# Patient Record
Sex: FEMALE | Race: BLACK OR AFRICAN AMERICAN | NOT HISPANIC OR LATINO | URBAN - METROPOLITAN AREA
[De-identification: names, ages, dates, MRNs, and addresses within clinical notes are randomized per-mention and may not be internally consistent; named-entity substitution may affect disease eponyms.]

---

## 2017-02-28 ENCOUNTER — OFFICE (OUTPATIENT)
Dept: URBAN - METROPOLITAN AREA CLINIC 12 | Facility: CLINIC | Age: 67
End: 2017-02-28
Payer: OTHER GOVERNMENT

## 2017-02-28 VITALS
HEIGHT: 64 IN | WEIGHT: 213 LBS | SYSTOLIC BLOOD PRESSURE: 136 MMHG | DIASTOLIC BLOOD PRESSURE: 87 MMHG | HEART RATE: 78 BPM

## 2017-02-28 DIAGNOSIS — E66.9 OBESITY, UNSPECIFIED: ICD-10-CM

## 2017-02-28 DIAGNOSIS — K21.9 GASTRO-ESOPHAGEAL REFLUX DISEASE WITHOUT ESOPHAGITIS: ICD-10-CM

## 2017-02-28 DIAGNOSIS — M06.9 RHEUMATOID ARTHRITIS, UNSPECIFIED: ICD-10-CM

## 2017-02-28 DIAGNOSIS — K30 FUNCTIONAL DYSPEPSIA: ICD-10-CM

## 2017-02-28 DIAGNOSIS — K58.9 IRRITABLE BOWEL SYNDROME WITHOUT DIARRHEA: ICD-10-CM

## 2017-02-28 DIAGNOSIS — R14.0 ABDOMINAL DISTENSION (GASEOUS): ICD-10-CM

## 2017-02-28 DIAGNOSIS — K59.00 CONSTIPATION, UNSPECIFIED: ICD-10-CM

## 2017-02-28 DIAGNOSIS — R10.13 EPIGASTRIC PAIN: ICD-10-CM

## 2017-02-28 DIAGNOSIS — I10 ESSENTIAL (PRIMARY) HYPERTENSION: ICD-10-CM

## 2017-02-28 PROCEDURE — G8427 DOCREV CUR MEDS BY ELIG CLIN: HCPCS | Performed by: INTERNAL MEDICINE

## 2017-02-28 PROCEDURE — 99214 OFFICE O/P EST MOD 30 MIN: CPT | Performed by: INTERNAL MEDICINE

## 2017-02-28 PROCEDURE — 36415 COLL VENOUS BLD VENIPUNCTURE: CPT | Performed by: INTERNAL MEDICINE

## 2017-02-28 RX ORDER — LANSOPRAZOLE 30 MG/1
CAPSULE, DELAYED RELEASE ORAL
Qty: 180 | Refills: 3 | Status: COMPLETED
Start: 2013-01-24 | End: 2017-02-28

## 2017-02-28 RX ORDER — ESOMEPRAZOLE MAGNESIUM 40 MG/1
40 CAPSULE, DELAYED RELEASE ORAL
Qty: 30 | Refills: 11 | Status: COMPLETED
Start: 2017-02-28 | End: 2017-08-01

## 2017-02-28 RX ORDER — RANITIDINE 150 MG/1
300 TABLET ORAL
Qty: 60 | Refills: 11 | Status: COMPLETED
Start: 2017-02-28 | End: 2017-09-14

## 2017-02-28 NOTE — SERVICEHPINOTES
Ms. Bellamy is a 66-year-old female with chronic abdominal discomfort who is here for follow-up. She has been seen in the past by Dr. Blunt and Dr. Suarez, and then most recently has been followed by Dr. Carson. She states that she has had issues for several years but especially last year has had some more problems with epigastric pain and indigestion. He will occur about every other day. She does not know of any particular food that causes it. She has tried to cut back on her diet and stay away from spicy food but she still has issues at times. She does not keep a food diary. The pain is a discomfort in her epigastric and left upper quadrant area. She also feels some burning in her chest. It will sometimes happen at night as well. She currently takes Prevacid twice a day. She states that she does not usually dinner after 6:00 but does admit to eating a bag of chips as a snack in the evenings. She has some relief with Mylanta and also takes a laxative as needed for constipation, though she does state that the symptoms can occur even when she is having regular bowel movements. She states that she had a colonoscopy by Dr. Carson in 2014 that was normal. She also had an EGD by him in June 2016 that was also reportedly negative. CT scan in June 2016 was normal except for diverticulosis and fatty liver. Abdominal x-ray around the same time was also normal, as was a chest x-ray. She underwent gastric emptying study in August 2016 that was normal. Blood work performed by her PCP in January 2017 revealed a normal CBC and BMP. 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 Biopsies of the stomach only revealed reactive gastropathy was negative for H. pylori. She had another EGD in 2012 with negative esophageal and stomach biopsies as well. She has tried Reglan in the past which caused side effects. She has had other imaging in the past as well and has been negative. Her last colonoscopy at [facility] was in 2008 and was normal. She states that she has had a positive H. pylori test in the past and was treated with antibiotics.

## 2017-02-28 NOTE — SERVICENOTES
The patient has multiple GI complaints which could very well be due to IBS but also could have an acid dyspepsia component or component of bacterial overgrowth.  She does also appear to have some concomitant gas bloat syndrome and constipation.  We will try to help regular bowel movements with fiber and also put her on a probiotic.  We will check a right upper quadrant ultrasound and consider HIDA scan if this is negative.  I will switch her from Prevacid to Nexium and also add an H2 blocker.  If this does not work we can consider switching to Dexilant or trial of Carafate.  We can also consider an antispasmodic.  I did a long discussion with the patient regarding her diet and recommended that she stay away from certain foods known to cause gas and bloating as well as a trial off of dairy for the time being.  She does take multiple medications which may also be playing a role with her symptoms.  We will try to obtain the most recent procedural records from Dr. Carson's office.  This time given that she has had multiple procedures in the past and I do not bleed repeating these would be of much benefit to her unless her symptoms change significantly.  She should also take Gaviscon as needed.

## 2017-03-04 LAB
ENDOMYSIAL ANTIBODY, IGA: ENA IGA: NEGATIVE
HEPATIC FUNCTION PANEL A: ALBUMIN: 4.1 G/DL (ref 3.5–5.2)
HEPATIC FUNCTION PANEL A: ALKALINE PHOSPHATASE: 85 U/L (ref 34–115)
HEPATIC FUNCTION PANEL A: DIRECT BILIRUBIN: 0.1 MG/DL (ref 0–0.2)
HEPATIC FUNCTION PANEL A: SGOT (AST): 20 U/L (ref 13–40)
HEPATIC FUNCTION PANEL A: SGPT (ALT): 16 U/L (ref 7–52)
HEPATIC FUNCTION PANEL A: TOTAL BILIRUBIN: 0.4 MG/DL (ref 0.3–1.2)
HEPATIC FUNCTION PANEL A: TOTAL PROTEIN: 6.7 G/DL (ref 6.4–8.3)
IMMUNOGLOBULIN A: 218 MG/DL (ref 70–400)
TISSUE TRANSGLUTAMINASE IGA AB: TTG IGA RESULT: <0.5 U/ML

## 2017-04-11 ENCOUNTER — OFFICE (OUTPATIENT)
Dept: URBAN - METROPOLITAN AREA CLINIC 14 | Facility: CLINIC | Age: 67
End: 2017-04-11
Payer: OTHER GOVERNMENT

## 2017-04-11 DIAGNOSIS — K30 FUNCTIONAL DYSPEPSIA: ICD-10-CM

## 2017-04-11 DIAGNOSIS — K21.9 GASTRO-ESOPHAGEAL REFLUX DISEASE WITHOUT ESOPHAGITIS: ICD-10-CM

## 2017-04-11 PROCEDURE — 91065 BREATH HYDROGEN/METHANE TEST: CPT | Performed by: INTERNAL MEDICINE

## 2017-04-11 PROCEDURE — G8427 DOCREV CUR MEDS BY ELIG CLIN: HCPCS | Performed by: INTERNAL MEDICINE

## 2017-05-01 ENCOUNTER — OFFICE (OUTPATIENT)
Dept: URBAN - METROPOLITAN AREA CLINIC 11 | Facility: CLINIC | Age: 67
End: 2017-05-01
Payer: OTHER GOVERNMENT

## 2017-05-01 VITALS
HEART RATE: 47 BPM | DIASTOLIC BLOOD PRESSURE: 78 MMHG | WEIGHT: 212 LBS | HEIGHT: 64 IN | SYSTOLIC BLOOD PRESSURE: 139 MMHG

## 2017-05-01 DIAGNOSIS — K59.00 CONSTIPATION, UNSPECIFIED: ICD-10-CM

## 2017-05-01 DIAGNOSIS — K21.9 GASTRO-ESOPHAGEAL REFLUX DISEASE WITHOUT ESOPHAGITIS: ICD-10-CM

## 2017-05-01 DIAGNOSIS — K30 FUNCTIONAL DYSPEPSIA: ICD-10-CM

## 2017-05-01 DIAGNOSIS — R14.0 ABDOMINAL DISTENSION (GASEOUS): ICD-10-CM

## 2017-05-01 DIAGNOSIS — E66.9 OBESITY, UNSPECIFIED: ICD-10-CM

## 2017-05-01 DIAGNOSIS — A04.9 BACTERIAL INTESTINAL INFECTION, UNSPECIFIED: ICD-10-CM

## 2017-05-01 DIAGNOSIS — K58.9 IRRITABLE BOWEL SYNDROME WITHOUT DIARRHEA: ICD-10-CM

## 2017-05-01 DIAGNOSIS — Z86.010 PERSONAL HISTORY OF COLONIC POLYPS: ICD-10-CM

## 2017-05-01 DIAGNOSIS — R14.1 GAS PAIN: ICD-10-CM

## 2017-05-01 DIAGNOSIS — M06.9 RHEUMATOID ARTHRITIS, UNSPECIFIED: ICD-10-CM

## 2017-05-01 PROCEDURE — G8427 DOCREV CUR MEDS BY ELIG CLIN: HCPCS | Performed by: INTERNAL MEDICINE

## 2017-05-01 PROCEDURE — 99213 OFFICE O/P EST LOW 20 MIN: CPT | Performed by: INTERNAL MEDICINE

## 2017-05-01 NOTE — SERVICEHPINOTES
Ms. Bellamy is a 66-year-old female with chronic abdominal discomfort who is here for follow-up. She has been seen in the past by Dr. Blunt and Dr. Suarez, and then most recently has been followed by Dr. Carson. She states that she has had issues for several years but especially last year has had some more problems with epigastric pain and indigestion. He will occur about every other day. She does not know of any particular food that causes it. She has tried to cut back on her diet and stay away from spicy food but she still has issues at times. She does not keep a food diary. She states that she does not usually dinner after 6:00 but does admit to eating a bag of chips as a snack in the evenings. She has some relief with Mylanta and also takes a laxative as needed for constipation, though she does state that the symptoms can occur even when she is having regular bowel movements. She states that she had a colonoscopy by Dr. Carson in 2014 that was normal. She also had an EGD by him in June 2016 that was negative with negative stomach, esophagus, and duodenal biopsies. CT scan in June 2016 was normal except for diverticulosis and fatty liver. Abdominal x-ray around the same time was also normal, as was a chest x-ray. She underwent gastric emptying study in August 2016 that was normal. Blood work performed by her PCP in January 2017 revealed a normal CBC and BMP. In the past at [facility] she had an EGD in October 2013 that revealed a ring in the lower esophagus and a 3 cm hiatus hernia. Biopsies of the stomach only revealed reactive gastropathy was negative for H. pylori. She had another EGD in 2012 with negative esophageal and stomach biopsies as well. She has tried Reglan in the past which caused side effects. She has had other imaging in the past as well and has been negative. Her last colonoscopy at [facility] was in 2008 and was normal. She states that she has had a positive H. pylori test in the past and was treated with antibiotics, but most recent EGD stomach biopsies were negative.When I initially saw her we checked basic blood work including liver enzymes and celiac labs which were all negative. Abdominal ultrasound only showed some fatty liver and benign kidney cysts. HIDA scan was normal with ejection fraction 59 percent. She did undergo lactulose breath test which did appear consistent with bacterial overgrowth. Unfortunately her insurance company denied use of Xifaxan so she was given a course of metronidazole. At the very end of treatment of this she did have some issues with hematuria and because of this went to [facility] last week and was treated with different antibiotics for a 10 day course, which she is still taking. This new antibiotic, cefdinir, has caused some relatively loose bowel movements. CT scan during her EGD visit was completely normal, as was blood work. Overall she states that her symptoms are a little better as she does not have much pain but still has more issues with bloating and indigestion depending on certain foods that she eats. She still does not keep a food diary.

## 2017-05-01 NOTE — SERVICENOTES
The patient has had an extensive negative workup as above.  The only thing that is slightly abnormal was positive lactulose breath test indicating possible small intestinal bacterial overgrowth.  Unfortunately, Xifaxan was not approved by her insurance company and so she underwent treatment with metronidazole.  She was recently diagnosed as having a UTI and is now on cefdinir.  She has had some slight change in her bowel habits since been on cefdinir which is likely just antibiotic effect, however I did recommend that she notify us if her balance did not go back to normal after completion of this I which time we should check for C. difficile, etc.  I will give her a trial of Dexilant to see if this may help her symptoms as she did not have good response with her current PPI as well as Prevacid.  She should continue her H2 blocker.  It is uncertain as to what role her other medications may play but she does take multiple other medications which could be playing a role in her symptoms.  I also discussed with her the importance of dietary modification and we did discuss the possibility of a FODMAP diet trial.  Fortunately, the patient does not have any red flag symptoms.  Her weight is stable.  She has had an extensive negative workup as above which has been negative.

## 2017-06-20 ENCOUNTER — OFFICE (OUTPATIENT)
Dept: URBAN - METROPOLITAN AREA CLINIC 12 | Facility: CLINIC | Age: 67
End: 2017-06-20
Payer: MEDICARE

## 2017-06-20 VITALS
SYSTOLIC BLOOD PRESSURE: 127 MMHG | HEIGHT: 64 IN | HEART RATE: 76 BPM | WEIGHT: 211 LBS | DIASTOLIC BLOOD PRESSURE: 84 MMHG

## 2017-06-20 DIAGNOSIS — R14.0 ABDOMINAL DISTENSION (GASEOUS): ICD-10-CM

## 2017-06-20 DIAGNOSIS — R19.4 CHANGE IN BOWEL HABIT: ICD-10-CM

## 2017-06-20 DIAGNOSIS — A04.7 ENTEROCOLITIS DUE TO CLOSTRIDIUM DIFFICILE: ICD-10-CM

## 2017-06-20 DIAGNOSIS — K22.2 ESOPHAGEAL OBSTRUCTION: ICD-10-CM

## 2017-06-20 DIAGNOSIS — K21.9 GASTRO-ESOPHAGEAL REFLUX DISEASE WITHOUT ESOPHAGITIS: ICD-10-CM

## 2017-06-20 DIAGNOSIS — R13.10 DYSPHAGIA, UNSPECIFIED: ICD-10-CM

## 2017-06-20 DIAGNOSIS — E66.9 OBESITY, UNSPECIFIED: ICD-10-CM

## 2017-06-20 DIAGNOSIS — R19.7 DIARRHEA, UNSPECIFIED: ICD-10-CM

## 2017-06-20 DIAGNOSIS — M06.9 RHEUMATOID ARTHRITIS, UNSPECIFIED: ICD-10-CM

## 2017-06-20 PROCEDURE — G8427 DOCREV CUR MEDS BY ELIG CLIN: HCPCS | Performed by: INTERNAL MEDICINE

## 2017-06-20 PROCEDURE — 99213 OFFICE O/P EST LOW 20 MIN: CPT | Performed by: INTERNAL MEDICINE

## 2017-06-20 NOTE — SERVICENOTES
The patient has had recent C. difficile infection causing diarrhea.  She has completed a two-week course of metronidazole and states that her bowel movements are better, they have not returned back to normal.  I am hopeful that they will continue to improve, however if her diarrhea worsens or if her stool sample, which she submitted yesterday, comes back positive for C. difficile again then we should proceed with oral vancomycin therapy.  She does have a history of some narrowing of her esophagus and underwent dilation last year.  She says as well as for repeat EGD with dilation with the patient states that she is not interested diet carefully, chew her food well, eat slowly and to notify us if she changes her mind and would like to proceed with esophageal dilation.

## 2017-06-20 NOTE — SERVICEHPINOTES
Ms. Bellamy is a 66-year-old female with chronic abdominal discomfort who is here for follow-up. She has been seen in the past by Dr. Blunt and Dr. Suarez, and then most recently has been followed by Dr. Carson. She states that she has had issues for several years but especially last year has had some more problems with epigastric pain and indigestion. He will occur about every other day. She does not know of any particular food that causes it. She has tried to cut back on her diet and stay away from spicy food but she still has issues at times. She does not keep a food diary. She states that she does not usually dinner after 6:00 but does admit to eating a bag of chips as a snack in the evenings. She has some relief with Mylanta and also takes a laxative as needed for constipation, though she does state that the symptoms can occur even when she is having regular bowel movements. She states that she had a colonoscopy by Dr. Carson in 2014 that was normal. She also had an EGD by him in June 2016 that was negative with negative stomach, esophagus, and duodenal biopsies.  There was mild narrowing of the esophagus and she underwent 50Fr dilation CT scan in June 2016 was normal except for diverticulosis and fatty liver. Abdominal x-ray around the same time was also normal, as was a chest x-ray. She underwent gastric emptying study in August 2016 that was normal. Blood work performed by her PCP in January 2017 revealed a normal CBC and BMP. In the past at [facility] she had an EGD in October 2013 that revealed a ring in the lower esophagus and a 3 cm hiatus hernia. Biopsies of the stomach only revealed reactive gastropathy was negative for H. pylori. She had another EGD in 2012 with negative esophageal and stomach biopsies as well. She has tried Reglan in the past which caused side effects. She has had other imaging in the past as well and has been negative. Her last colonoscopy at [facility] was in 2008 and was normal. She states that she has had a positive H. pylori test in the past and was treated with antibiotics, but most recent EGD stomach biopsies were negative. When I initially saw her we checked basic blood work including liver enzymes and celiac labs which were all negative. Abdominal ultrasound only showed some fatty liver and benign kidney cysts. HIDA scan was normal with ejection fraction 59 percent. She did undergo lactulose breath test which did appear consistent with bacterial overgrowth. Unfortunately her insurance company denied use of Xifaxan so she was given a course of metronidazole. At the very end of treatment of this she did have some issues with hematuria and because of this went to [facility] and was treated with different antibiotics for a 10 day course. CT scan during her ED visit was completely normal, as was blood work. During taking her antibiotics for her urinary tract infection she started having worse diarrhea that persisted. Because of this she was seen by her PCP at the beginning of June where stool study confirmed that she was positive for C. difficile. Because of this she was started on metronidazole. She states that her diarrhea persisted but has slowly improved. She just completed her course of measure states that she is no longer having any watery bowel movements. During her flare she had 6-7 watery to loose bowel movement today but now she states that she is to having only 2-3 bowel movements which are soft to loose. They are brown. They have much more form. Her bowel movements are not quite back to normal. She has not had any weight loss over this time. Two. She also notes occasional dysphagia mainly with pills but sometimes with solid food. BR

## 2017-08-01 ENCOUNTER — OFFICE (OUTPATIENT)
Dept: URBAN - METROPOLITAN AREA CLINIC 12 | Facility: CLINIC | Age: 67
End: 2017-08-01
Payer: MEDICARE

## 2017-08-01 VITALS
HEART RATE: 68 BPM | WEIGHT: 209 LBS | HEIGHT: 64 IN | DIASTOLIC BLOOD PRESSURE: 96 MMHG | SYSTOLIC BLOOD PRESSURE: 154 MMHG

## 2017-08-01 DIAGNOSIS — K59.00 CONSTIPATION, UNSPECIFIED: ICD-10-CM

## 2017-08-01 DIAGNOSIS — R14.0 ABDOMINAL DISTENSION (GASEOUS): ICD-10-CM

## 2017-08-01 DIAGNOSIS — K22.2 ESOPHAGEAL OBSTRUCTION: ICD-10-CM

## 2017-08-01 DIAGNOSIS — A04.9 BACTERIAL INTESTINAL INFECTION, UNSPECIFIED: ICD-10-CM

## 2017-08-01 DIAGNOSIS — E66.9 OBESITY, UNSPECIFIED: ICD-10-CM

## 2017-08-01 DIAGNOSIS — I10 ESSENTIAL (PRIMARY) HYPERTENSION: ICD-10-CM

## 2017-08-01 DIAGNOSIS — K21.9 GASTRO-ESOPHAGEAL REFLUX DISEASE WITHOUT ESOPHAGITIS: ICD-10-CM

## 2017-08-01 DIAGNOSIS — M06.9 RHEUMATOID ARTHRITIS, UNSPECIFIED: ICD-10-CM

## 2017-08-01 DIAGNOSIS — A04.7 ENTEROCOLITIS DUE TO CLOSTRIDIUM DIFFICILE: ICD-10-CM

## 2017-08-01 PROCEDURE — G8427 DOCREV CUR MEDS BY ELIG CLIN: HCPCS | Performed by: INTERNAL MEDICINE

## 2017-08-01 PROCEDURE — 99213 OFFICE O/P EST LOW 20 MIN: CPT | Performed by: INTERNAL MEDICINE

## 2017-08-01 RX ORDER — METRONIDAZOLE 500 MG/1
TABLET, FILM COATED ORAL
Qty: 28 | Refills: 1 | Status: COMPLETED
Start: 2017-08-01 | End: 2017-10-25

## 2017-08-01 NOTE — SERVICENOTES
Her C diff appears to have resolved with Flagyl therapy.  Overall she states that she is doing better but does still have some bloating issues.  We did discuss the importance of staying away from certain foods that cause bloating and I did recommend she start taking a probiotic.  We also discussed the possible long-term effects of PPI use and she states she is willing to give H2 blocker therapy and try but would like to go back to Pepcid if symptoms worsen.  Given that she is having some more symptoms that could be consistent with return of her bacterial overgrowth it is reasonable to treat again with antibiotics, however, while her C diff was likely due to the urinary tract infection antibiotic she received just after she took Xifaxan, the close proximity does make me somewhat concerned about the possibility of Xifaxan been a trigger for C diff, though this is rare.  Because of this we will give her another round of Flagyl to see if this may help her. if it does not and her symptoms worsen we can always consider another course of Xifaxan or Alinia down the road.

## 2017-08-01 NOTE — SERVICEHPINOTES
Ms. Bellamy is a 66-year-old female with chronic abdominal discomfort who is here for follow-up. She has been seen in the past by Dr. Blunt and Dr. Suarez, and then most recently has been followed by Dr. Carson. She states that she has had issues for several years but especially last year has had some more problems with epigastric pain and indigestion. He will occur about every other day. She does not know of any particular food that causes it. She has tried to cut back on her diet and stay away from spicy food but she still has issues at times. She does not keep a food diary. She states that she does not usually dinner after 6:00 but does admit to eating a bag of chips as a snack in the evenings. She has some relief with Mylanta and also takes a laxative as needed for constipation, though she does state that the symptoms can occur even when she is having regular bowel movements. She states that she had a colonoscopy by Dr. Carson in 2014 that was normal. She also had an EGD by him in June 2016 that was negative with negative stomach, esophagus, and duodenal biopsies. There was mild narrowing of the esophagus and she underwent 50Fr dilation CT scan in June 2016 was normal except for diverticulosis and fatty liver. Abdominal x-ray around the same time was also normal, as was a chest x-ray. She underwent gastric emptying study in August 2016 that was normal. Blood work performed by her PCP in January 2017 revealed a normal CBC and BMP. In the past at [facility] she had an EGD in October 2013 that revealed a ring in the lower esophagus and a 3 cm hiatus hernia. Biopsies of the stomach only revealed reactive gastropathy was negative for H. pylori. She had another EGD in 2012 with negative esophageal and stomach biopsies as well. She has tried Reglan in the past which caused side effects. She has had other imaging in the past as well and has been negative. Her last colonoscopy at [facility] was in 2008 and was normal. She states that she has had a positive H. pylori test in the past and was treated with antibiotics, but most recent EGD stomach biopsies were negative. When I initially saw her we checked basic blood work including liver enzymes and celiac labs which were all negative. Abdominal ultrasound only showed some fatty liver and benign kidney cysts. HIDA scan was normal with ejection fraction 59 percent. She did undergo lactulose breath test which did appear consistent with bacterial overgrowth. Unfortunately her insurance company denied use of Xifaxan so she was given a course of metronidazole. At the very end of treatment of this she did have some issues with hematuria and because of this went to [facility] and was treated with different antibiotics for a 10 day course. CT scan during her ED visit was completely normal, as was blood work. While taking her antibiotics for her urinary tract infection she started having worse diarrhea that persisted. Because of this she was seen by her PCP at the beginning of June 2017 where stool study confirmed that she was positive for C. difficile. Because of this she was started on metronidazole, which has since improved her symptoms.  She now states that her diarrhea has resolved and she is not having 1 formed bowel movement a day. She does have some bloating that is mainly after eating salads but can be with other foods as well. It is similar to prior to her being treated for Xifaxan and she is wondering if her bacterial overgrowth is coming back. She denies any real abdominal pain. She states that she also would like to switch off of omeprazole back to Prevacid that she states the omeprazole causes some epigastric burning. She denies any fevers, chills, nausea, vomiting, rectal bleeding, or weight loss. BR

## 2017-09-14 ENCOUNTER — OFFICE (OUTPATIENT)
Dept: URBAN - METROPOLITAN AREA CLINIC 14 | Facility: CLINIC | Age: 67
End: 2017-09-14
Payer: OTHER GOVERNMENT

## 2017-09-14 VITALS
WEIGHT: 212 LBS | DIASTOLIC BLOOD PRESSURE: 73 MMHG | HEIGHT: 64 IN | HEART RATE: 57 BPM | SYSTOLIC BLOOD PRESSURE: 142 MMHG

## 2017-09-14 DIAGNOSIS — E66.9 OBESITY, UNSPECIFIED: ICD-10-CM

## 2017-09-14 DIAGNOSIS — K21.9 GASTRO-ESOPHAGEAL REFLUX DISEASE WITHOUT ESOPHAGITIS: ICD-10-CM

## 2017-09-14 DIAGNOSIS — R19.7 DIARRHEA, UNSPECIFIED: ICD-10-CM

## 2017-09-14 DIAGNOSIS — A04.7 ENTEROCOLITIS DUE TO CLOSTRIDIUM DIFFICILE: ICD-10-CM

## 2017-09-14 DIAGNOSIS — A04.9 BACTERIAL INTESTINAL INFECTION, UNSPECIFIED: ICD-10-CM

## 2017-09-14 DIAGNOSIS — K59.00 CONSTIPATION, UNSPECIFIED: ICD-10-CM

## 2017-09-14 DIAGNOSIS — R14.0 ABDOMINAL DISTENSION (GASEOUS): ICD-10-CM

## 2017-09-14 DIAGNOSIS — R19.4 CHANGE IN BOWEL HABIT: ICD-10-CM

## 2017-09-14 PROCEDURE — 99214 OFFICE O/P EST MOD 30 MIN: CPT | Performed by: INTERNAL MEDICINE

## 2017-09-14 PROCEDURE — G8427 DOCREV CUR MEDS BY ELIG CLIN: HCPCS | Performed by: INTERNAL MEDICINE

## 2017-09-14 RX ORDER — HYOSCYAMINE SULFATE 0.12 MG/1
TABLET ORAL; SUBLINGUAL
Qty: 90 | Refills: 3 | Status: COMPLETED
Start: 2017-09-14 | End: 2018-02-06

## 2017-09-14 RX ORDER — RANITIDINE 150 MG/1
300 TABLET ORAL
Qty: 60 | Refills: 11 | Status: COMPLETED
Start: 2017-02-28 | End: 2017-09-14

## 2017-09-14 NOTE — SERVICENOTES
The patient has longstanding GI symptoms but did have some C diff colitis recently after receiving antibiotics for urinary tract infection.  She did respond well with metronidazole and had improvement of symptoms but now she is having some return of some of her IBS symptoms.  It is uncertain as to whether not the metronidazole as causing some burning discomfort or this is due to her IBS.  She did have some diarrhea last week.  She did bring a stool sample from today, but since she is on metronidazole I do not think that it would be beneficial to test as she is on antibiotics.  I recommended she complete a course of metronidazole, which should be finished by next week.  If the diarrhea persists then we will recheck her stool for C diff.  As she has not had a colonoscopy in over 3 years and her last EGD was over a year ago and she is still having persistent symptoms the patient is interested in repeating endoscopic evaluation, which is reasonable.  We will schedule her for this accordingly.  I also give her a trial of an antispasmodic to see this may help.

## 2017-09-14 NOTE — SERVICEHPINOTES
Ms. Bellamy is a 67-year-old female with chronic abdominal discomfort who is here for follow-up. She has been seen in the past by Dr. Blunt and Dr. Suarez, and then most recently has been followed by Dr. Carson. She states that she has had issues for several years but especially last year has had some more problems with epigastric pain and indigestion. He will occur about every other day. She does not know of any particular food that causes it. She has tried to cut back on her diet and stay away from spicy food but she still has issues at times. She does not keep a food diary. She states that she does not usually dinner after 6:00 but does admit to eating a bag of chips as a snack in the evenings. She has some relief with Mylanta and also takes a laxative as needed for constipation, though she does state that the symptoms can occur even when she is having regular bowel movements. She states that she had a colonoscopy by Dr. Carson in 2014 that was normal. She also had an EGD by him in June 2016 that was negative with negative stomach, esophagus, and duodenal biopsies. There was mild narrowing of the esophagus and she underwent 50Fr dilation CT scan in June 2016 was normal except for diverticulosis and fatty liver. Abdominal x-ray around the same time was also normal, as was a chest x-ray. She underwent gastric emptying study in August 2016 that was normal. Blood work performed by her PCP in January 2017 revealed a normal CBC and BMP. In the past at [facility] she had an EGD in October 2013 that revealed a ring in the lower esophagus and a 3 cm hiatus hernia. Biopsies of the stomach only revealed reactive gastropathy was negative for H. pylori. She had another EGD in 2012 with negative esophageal and stomach biopsies as well. She has tried Reglan in the past which caused side effects. She has had other imaging in the past as well and has been negative. Her last colonoscopy at [facility] was in 2008 and was normal. She states that she has had a positive H. pylori test in the past and was treated with antibiotics, but most recent EGD stomach biopsies were negative. When I initially saw her we checked basic blood work including liver enzymes and celiac labs which were all negative. Abdominal ultrasound only showed some fatty liver and benign kidney cysts. HIDA scan was normal with ejection fraction 59 percent. She did undergo lactulose breath test which did appear consistent with bacterial overgrowth. Unfortunately her insurance company denied use of Xifaxan so she was given a course of metronidazole. At the very end of treatment of this she did have some issues with hematuria and because of this went to [facility] and was treated with different antibiotics for a 10 day course. CT scan during her ED visit was completely normal, as was blood work. While taking her antibiotics for her urinary tract infection she started having worse diarrhea that persisted. Because of this she was seen by her PCP at the beginning of June 2017 where stool study confirmed that she was positive for C. difficile. Because of this she was started on metronidazole, which has since improved her symptoms. The patient's symptoms initially improved after treatment with metronidazole. When I last saw her she started having some return of some of her symptoms and so because of this I recommended going back on other trial of metronidazole. Also recommended switching from her PPI to ranitidine. The patient has continued is a both ranitidine and her PPI. She states that her symptoms, go on metronidazole. She states some weeks that she will do well but then other weeks she will have generalized constant burning discomfort. She is uncertain as to whether not the metronidazole is causing her burning discomfort. She continues to take a probiotic. She has not been tried on an antispasmodic. She is concerned that she has cancer because of her persistent symptoms even though she has had symptoms for several years. She denies any fevers or chills. She has gained 3 lb since her last visit. She states that she has tried to be on a bland diet but does admit to eating some fried food and dairy on occasional basis. She did have some diarrhea last week but states that it has gone away this week. she admits that when she takes Zofran she becomes more constipated.BR

## 2017-10-25 ENCOUNTER — AMBULATORY SURGICAL CENTER (OUTPATIENT)
Dept: URBAN - METROPOLITAN AREA SURGERY 3 | Facility: SURGERY | Age: 67
End: 2017-10-25

## 2017-10-25 ENCOUNTER — OFFICE (OUTPATIENT)
Dept: URBAN - METROPOLITAN AREA CLINIC 11 | Facility: CLINIC | Age: 67
End: 2017-10-25

## 2017-10-25 ENCOUNTER — AMBULATORY SURGICAL CENTER (OUTPATIENT)
Dept: URBAN - METROPOLITAN AREA SURGERY 3 | Facility: SURGERY | Age: 67
End: 2017-10-25
Payer: COMMERCIAL

## 2017-10-25 VITALS
HEART RATE: 83 BPM | RESPIRATION RATE: 16 BRPM | HEART RATE: 82 BPM | RESPIRATION RATE: 18 BRPM | OXYGEN SATURATION: 98 % | DIASTOLIC BLOOD PRESSURE: 74 MMHG | RESPIRATION RATE: 16 BRPM | OXYGEN SATURATION: 94 % | DIASTOLIC BLOOD PRESSURE: 70 MMHG | OXYGEN SATURATION: 97 % | DIASTOLIC BLOOD PRESSURE: 81 MMHG | TEMPERATURE: 98 F | DIASTOLIC BLOOD PRESSURE: 77 MMHG | DIASTOLIC BLOOD PRESSURE: 81 MMHG | RESPIRATION RATE: 18 BRPM | OXYGEN SATURATION: 93 % | SYSTOLIC BLOOD PRESSURE: 124 MMHG | SYSTOLIC BLOOD PRESSURE: 121 MMHG | DIASTOLIC BLOOD PRESSURE: 74 MMHG | RESPIRATION RATE: 19 BRPM | HEART RATE: 99 BPM | SYSTOLIC BLOOD PRESSURE: 144 MMHG | HEIGHT: 64 IN | TEMPERATURE: 98 F | RESPIRATION RATE: 20 BRPM | DIASTOLIC BLOOD PRESSURE: 73 MMHG | SYSTOLIC BLOOD PRESSURE: 121 MMHG | HEIGHT: 64 IN | DIASTOLIC BLOOD PRESSURE: 73 MMHG | HEART RATE: 83 BPM | DIASTOLIC BLOOD PRESSURE: 70 MMHG | RESPIRATION RATE: 19 BRPM | SYSTOLIC BLOOD PRESSURE: 124 MMHG | HEART RATE: 82 BPM | OXYGEN SATURATION: 97 % | SYSTOLIC BLOOD PRESSURE: 110 MMHG | SYSTOLIC BLOOD PRESSURE: 118 MMHG | WEIGHT: 212 LBS | DIASTOLIC BLOOD PRESSURE: 77 MMHG | OXYGEN SATURATION: 93 % | TEMPERATURE: 96.7 F | WEIGHT: 212 LBS | OXYGEN SATURATION: 98 % | TEMPERATURE: 96.7 F | OXYGEN SATURATION: 94 % | OXYGEN SATURATION: 99 % | HEART RATE: 88 BPM | RESPIRATION RATE: 20 BRPM | SYSTOLIC BLOOD PRESSURE: 110 MMHG | OXYGEN SATURATION: 99 % | SYSTOLIC BLOOD PRESSURE: 118 MMHG | SYSTOLIC BLOOD PRESSURE: 144 MMHG | HEART RATE: 99 BPM | HEART RATE: 88 BPM

## 2017-10-25 DIAGNOSIS — R14.0 ABDOMINAL DISTENSION (GASEOUS): ICD-10-CM

## 2017-10-25 DIAGNOSIS — R19.7 DIARRHEA, UNSPECIFIED: ICD-10-CM

## 2017-10-25 DIAGNOSIS — K29.50 UNSPECIFIED CHRONIC GASTRITIS WITHOUT BLEEDING: ICD-10-CM

## 2017-10-25 DIAGNOSIS — K64.0 FIRST DEGREE HEMORRHOIDS: ICD-10-CM

## 2017-10-25 DIAGNOSIS — K21.9 GASTRO-ESOPHAGEAL REFLUX DISEASE WITHOUT ESOPHAGITIS: ICD-10-CM

## 2017-10-25 DIAGNOSIS — K57.30 DIVERTICULOSIS OF LARGE INTESTINE WITHOUT PERFORATION OR ABS: ICD-10-CM

## 2017-10-25 DIAGNOSIS — K63.5 POLYP OF COLON: ICD-10-CM

## 2017-10-25 PROBLEM — D12.5 BENIGN NEOPLASM OF SIGMOID COLON: Status: ACTIVE | Noted: 2017-10-25

## 2017-10-25 PROBLEM — K52.89 CLINICALLY SIGNIFICANT DIARRHEA OF UNEXPLAINED ORIGIN: Status: ACTIVE | Noted: 2017-10-25

## 2017-10-25 PROCEDURE — 43239 EGD BIOPSY SINGLE/MULTIPLE: CPT | Performed by: INTERNAL MEDICINE

## 2017-10-25 PROCEDURE — 45380 COLONOSCOPY AND BIOPSY: CPT | Performed by: INTERNAL MEDICINE

## 2017-10-25 PROCEDURE — 88305 TISSUE EXAM BY PATHOLOGIST: CPT | Performed by: INTERNAL MEDICINE

## 2017-10-25 PROCEDURE — 88313 SPECIAL STAINS GROUP 2: CPT | Performed by: INTERNAL MEDICINE

## 2017-10-25 PROCEDURE — G8918 PT W/O PREOP ORDER IV AB PRO: HCPCS | Performed by: INTERNAL MEDICINE

## 2017-10-25 PROCEDURE — G8907 PT DOC NO EVENTS ON DISCHARG: HCPCS | Performed by: INTERNAL MEDICINE

## 2017-10-25 PROCEDURE — 88342 IMHCHEM/IMCYTCHM 1ST ANTB: CPT | Performed by: INTERNAL MEDICINE

## 2017-11-22 ENCOUNTER — OFFICE (OUTPATIENT)
Dept: URBAN - METROPOLITAN AREA CLINIC 11 | Facility: CLINIC | Age: 67
End: 2017-11-22
Payer: OTHER GOVERNMENT

## 2017-11-22 VITALS
SYSTOLIC BLOOD PRESSURE: 129 MMHG | DIASTOLIC BLOOD PRESSURE: 70 MMHG | HEART RATE: 54 BPM | HEIGHT: 64 IN | WEIGHT: 209 LBS

## 2017-11-22 DIAGNOSIS — K21.9 GASTRO-ESOPHAGEAL REFLUX DISEASE WITHOUT ESOPHAGITIS: ICD-10-CM

## 2017-11-22 DIAGNOSIS — K30 FUNCTIONAL DYSPEPSIA: ICD-10-CM

## 2017-11-22 DIAGNOSIS — R14.0 ABDOMINAL DISTENSION (GASEOUS): ICD-10-CM

## 2017-11-22 DIAGNOSIS — A04.71 ENTEROCOLITIS DUE TO CLOSTRIDIUM DIFFICILE, RECURRENT: ICD-10-CM

## 2017-11-22 DIAGNOSIS — M06.9 RHEUMATOID ARTHRITIS, UNSPECIFIED: ICD-10-CM

## 2017-11-22 DIAGNOSIS — E66.9 OBESITY, UNSPECIFIED: ICD-10-CM

## 2017-11-22 DIAGNOSIS — K58.9 IRRITABLE BOWEL SYNDROME WITHOUT DIARRHEA: ICD-10-CM

## 2017-11-22 DIAGNOSIS — A04.9 BACTERIAL INTESTINAL INFECTION, UNSPECIFIED: ICD-10-CM

## 2017-11-22 DIAGNOSIS — K59.00 CONSTIPATION, UNSPECIFIED: ICD-10-CM

## 2017-11-22 PROCEDURE — G8427 DOCREV CUR MEDS BY ELIG CLIN: HCPCS | Performed by: INTERNAL MEDICINE

## 2017-11-22 PROCEDURE — 99213 OFFICE O/P EST LOW 20 MIN: CPT | Performed by: INTERNAL MEDICINE

## 2017-11-22 NOTE — SERVICENOTES
The patient has longstanding GI symptoms that are likely due to IBS.  She also has chronic reflux.  She is hesitant to continue PPI use which is reasonable.  We did discuss the possible long-term effects of PPI use.  I did recommend that she switch to Pepcid to see if this may not cause the same problems that Zantac cause.  If this does cause issues for her then she can consider Tagamet.  If these do not work then we may consider going back to a low-dose PPI.  She does appear to be doing better from a symptomatic standpoint otherwise and so I did recommend she continue to work on dietary modification weight loss.  Given her constipation issues we will give her some samples of Trulance to see if this may work for her.  If it does we can call in a prescription.  She should otherwise notify us if her symptoms do worsen in the meantime.

## 2017-11-22 NOTE — SERVICEHPINOTES
Ms. Bellamy is a 67-year-old female with chronic abdominal discomfort who is here for follow-up. She has been seen in the past by Dr. Blunt and Dr. Suarez, and then most recently has been followed by Dr. Carson. She states that she has had issues for several years but especially last year has had some more problems with epigastric pain and indigestion. He will occur about every other day. She does not know of any particular food that causes it. She has tried to cut back on her diet and stay away from spicy food but she still has issues at times. She does not keep a food diary. She states that she does not usually dinner after 6:00 but does admit to eating a bag of chips as a snack in the evenings. She has some relief with Mylanta and also takes a laxative as needed for constipation, though she does state that the symptoms can occur even when she is having regular bowel movements. She states that she had a colonoscopy by Dr. Carson in 2014 that was normal. She also had an EGD by him in June 2016 that was negative with negative stomach, esophagus, and duodenal biopsies. There was mild narrowing of the esophagus and she underwent 50Fr dilation CT scan in June 2016 was normal except for diverticulosis and fatty liver. Abdominal x-ray around the same time was also normal, as was a chest x-ray. She underwent gastric emptying study in August 2016 that was normal. Blood work performed by her PCP in January 2017 revealed a normal CBC and BMP. In the past at [facility] she had an EGD in October 2013 that revealed a ring in the lower esophagus and a 3 cm hiatus hernia. Biopsies of the stomach only revealed reactive gastropathy was negative for H. pylori. She had another EGD in 2012 with negative esophageal and stomach biopsies as well. She has tried Reglan in the past which caused side effects. She has had other imaging in the past as well and has been negative. Her last colonoscopy at [facility] was in 2008 and was normal. She states that she has had a positive H. pylori test in the past and was treated with antibiotics, but most recent EGD stomach biopsies were negative. When I initially saw her we checked basic blood work including liver enzymes and celiac labs which were all negative. Abdominal ultrasound only showed some fatty liver and benign kidney cysts. HIDA scan was normal with ejection fraction 59 percent. She did undergo lactulose breath test which did appear consistent with bacterial overgrowth. Unfortunately her insurance company denied use of Xifaxan so she was given a course of metronidazole. At the very end of treatment of this she did have some issues with hematuria and because of this went to [facility] and was treated with different antibiotics for a 10 day course. CT scan during her ED visit was completely normal, as was blood work. While taking her antibiotics for her urinary tract infection she started having worse diarrhea that persisted. Because of this she was seen by her PCP at the beginning of June 2017 where stool study confirmed that she was positive for C. difficile. Because of this she was started on metronidazole, which has since improved her symptoms. The patient's symptoms initially improved after treatment with metronidazole. Because of some return of symptoms she underwent EGD and colonoscopy in October 2017 with EGD overall normal with negative stomach and duodenal biopsies. There was some mild lymphangiectasia. Esophageal biopsies were also negative. The colon was also normal to the terminal ileum with only a small polyp removed. It was hyperplastic. She now tells me today that she has a history of adenomatous colon polyps in the past and so her next colonoscopy will be due in 5 years. She states overall she is doing better though she is concerned about long-term PPI use. Because of this she was taking only ranitidine but she states that ranitidine cause worse constipation and tinnitus. She has since stopped ranitidine and her reflux and indigestion symptoms have worsened. She otherwise denies any fevers, chills, nausea, or vomiting. She has not had any further diarrhea.BR

## 2018-02-06 ENCOUNTER — OFFICE (OUTPATIENT)
Dept: URBAN - METROPOLITAN AREA CLINIC 12 | Facility: CLINIC | Age: 68
End: 2018-02-06
Payer: OTHER GOVERNMENT

## 2018-02-06 VITALS
HEART RATE: 91 BPM | HEIGHT: 64 IN | SYSTOLIC BLOOD PRESSURE: 115 MMHG | WEIGHT: 212 LBS | DIASTOLIC BLOOD PRESSURE: 76 MMHG

## 2018-02-06 DIAGNOSIS — K57.30 DIVERTICULOSIS OF LARGE INTESTINE WITHOUT PERFORATION OR ABS: ICD-10-CM

## 2018-02-06 DIAGNOSIS — E66.9 OBESITY, UNSPECIFIED: ICD-10-CM

## 2018-02-06 DIAGNOSIS — K30 FUNCTIONAL DYSPEPSIA: ICD-10-CM

## 2018-02-06 DIAGNOSIS — K58.9 IRRITABLE BOWEL SYNDROME WITHOUT DIARRHEA: ICD-10-CM

## 2018-02-06 DIAGNOSIS — M06.9 RHEUMATOID ARTHRITIS, UNSPECIFIED: ICD-10-CM

## 2018-02-06 DIAGNOSIS — R14.0 ABDOMINAL DISTENSION (GASEOUS): ICD-10-CM

## 2018-02-06 DIAGNOSIS — I10 ESSENTIAL (PRIMARY) HYPERTENSION: ICD-10-CM

## 2018-02-06 DIAGNOSIS — A04.71 ENTEROCOLITIS DUE TO CLOSTRIDIUM DIFFICILE, RECURRENT: ICD-10-CM

## 2018-02-06 DIAGNOSIS — K59.00 CONSTIPATION, UNSPECIFIED: ICD-10-CM

## 2018-02-06 DIAGNOSIS — K21.9 GASTRO-ESOPHAGEAL REFLUX DISEASE WITHOUT ESOPHAGITIS: ICD-10-CM

## 2018-02-06 DIAGNOSIS — A04.9 BACTERIAL INTESTINAL INFECTION, UNSPECIFIED: ICD-10-CM

## 2018-02-06 PROCEDURE — 99213 OFFICE O/P EST LOW 20 MIN: CPT | Performed by: INTERNAL MEDICINE

## 2018-02-06 RX ORDER — LANSOPRAZOLE 30 MG/1
CAPSULE, DELAYED RELEASE PELLETS ORAL
Qty: 90 | Refills: 3 | Status: ACTIVE

## 2018-02-06 NOTE — SERVICEHPINOTES
Ms. Bellamy is a 67-year-old female with chronic GERD and constipation who is here for follow-up. She states that she has had issues for several years. When I initially saw her she was have more issues with epigastric pain and indigestion. She states that she had a colonoscopy by Dr. Carson in 2014 that was normal. She also had an EGD by him in June 2016 that was negative with negative stomach, esophagus, and duodenal biopsies. There was mild narrowing of the esophagus and she underwent 50Fr dilation CT scan in June 2016 was normal except for diverticulosis and fatty liver. Abdominal x-ray around the same time was also normal, as was a chest x-ray. She underwent gastric emptying study in August 2016 that was normal. Blood work performed by her PCP in January 2017 revealed a normal CBC and BMP. In the past at [facility] she had an EGD in October 2013 that revealed a ring in the lower esophagus and a 3 cm hiatus hernia. Biopsies of the stomach only revealed reactive gastropathy was negative for H. pylori. She had another EGD in 2012 with negative esophageal and stomach biopsies as well. She has tried Reglan in the past which caused side effects. She has had other imaging in the past as well and has been negative. She states that she has had a positive H. pylori test in the past and was treated with antibiotics, but most recent EGD stomach biopsies were negative. When I initially saw her we checked basic blood work including liver enzymes and celiac labs which were all negative. Abdominal ultrasound only showed some fatty liver and benign kidney cysts. HIDA scan was normal with ejection fraction 59 percent. She did undergo lactulose breath test which did appear consistent with bacterial overgrowth. Unfortunately her insurance company denied use of Xifaxan so she was given a course of metronidazole. At the very end of treatment of this she did have some issues with hematuria and because of this went to [facility] and was treated with different antibiotics for a 10 day course. CT scan during her ED visit was completely normal, as was blood work. While taking her antibiotics for her urinary tract infection she started having worse diarrhea that persisted. Because of this she was seen by her PCP at the beginning of June 2017 where stool study confirmed that she was positive for C. difficile. Because of this she was started on metronidazole, which has since improved her symptoms. Because of some return of symptoms she underwent EGD and colonoscopy in October 2017 with EGD overall normal with negative stomach and duodenal biopsies. There was some mild lymphangiectasia. Esophageal biopsies were also negative. The colon was also normal to the terminal ileum with only a small polyp removed. It was hyperplastic. She states that she was told she had pre-cancerous polyps at some point and so her next colonoscopy is due in 2022. Overall she has been doing relatively well since then. We did try to switch her to an H2 blocker but she states ranitidine caused constipation and tendinitis. Pepcid did not work either. She has been on lansoprazole for the past three months which she states works very well for her. She also states that her constipation is better. We gave her Trulance in the past, however she states now she only eats an apple every day and tries to eat more fiber in her diet. She does admit to using MiraLax and a fiber supplement only 3 times a week but states that for the most part this controls her symptoms. She does take occasional NSAIDs for rheumatoid arthritis and has been weaned off of Rituxan. She is on chronic prednisone therapy for her rheumatoid arthritis and she states she recently had a DEXA scan that was negative for osteoporosis. She does continue to take a probiotic as well. She denies any fevers, chills, nausea, vomiting, rectal bleeding, or weight loss. She has gained 3 lb since our last visit.LYNNETTE

## 2018-02-06 NOTE — SERVICENOTES
Overall she is doing much better.  She did not do well with a trial of H2 blockers but I do recommend trying to get her on the lowest dose of PPI possible, especially as she is on long-term prednisone use and is at risk for osteoporosis.  We will give her a trial of lansoprazole 15 mg daily.  If this does not work she should notify us and we can go back to the 30 mg dose.  In the meantime we did also reiterate the importance of lifestyle modification, especially weight loss.  She does admit that she does eat snacks throughout the day and does not exercise much.  I also recommended she consider taking fiber supplement and possibly even MiraLax on a more regular basis to improve her bowel habits.  She should otherwise notify us if symptoms worsen in the meantime.

## 2018-08-16 ENCOUNTER — OFFICE (OUTPATIENT)
Dept: URBAN - METROPOLITAN AREA CLINIC 14 | Facility: CLINIC | Age: 68
End: 2018-08-16
Payer: OTHER GOVERNMENT

## 2018-08-16 VITALS
DIASTOLIC BLOOD PRESSURE: 71 MMHG | WEIGHT: 222 LBS | HEIGHT: 64 IN | HEART RATE: 54 BPM | SYSTOLIC BLOOD PRESSURE: 116 MMHG

## 2018-08-16 DIAGNOSIS — R14.0 ABDOMINAL DISTENSION (GASEOUS): ICD-10-CM

## 2018-08-16 DIAGNOSIS — K30 FUNCTIONAL DYSPEPSIA: ICD-10-CM

## 2018-08-16 DIAGNOSIS — K58.1 IRRITABLE BOWEL SYNDROME WITH CONSTIPATION: ICD-10-CM

## 2018-08-16 DIAGNOSIS — M06.9 RHEUMATOID ARTHRITIS, UNSPECIFIED: ICD-10-CM

## 2018-08-16 DIAGNOSIS — A04.9 BACTERIAL INTESTINAL INFECTION, UNSPECIFIED: ICD-10-CM

## 2018-08-16 DIAGNOSIS — K21.9 GASTRO-ESOPHAGEAL REFLUX DISEASE WITHOUT ESOPHAGITIS: ICD-10-CM

## 2018-08-16 DIAGNOSIS — E66.9 OBESITY, UNSPECIFIED: ICD-10-CM

## 2018-08-16 DIAGNOSIS — I10 ESSENTIAL (PRIMARY) HYPERTENSION: ICD-10-CM

## 2018-08-16 PROCEDURE — 99213 OFFICE O/P EST LOW 20 MIN: CPT | Performed by: INTERNAL MEDICINE

## 2018-08-16 RX ORDER — SUCRALFATE 1 G/10ML
4000 SUSPENSION ORAL
Qty: 1 | Refills: 1 | Status: COMPLETED
Start: 2018-08-16 | End: 2020-08-05

## 2018-08-16 RX ORDER — LANSOPRAZOLE 30 MG/1
CAPSULE, DELAYED RELEASE PELLETS ORAL
Qty: 90 | Refills: 3 | Status: ACTIVE

## 2018-08-16 NOTE — SERVICENOTES
The patient has had some worsening symptoms of her normal indigestion, nausea, bloating, gas, and constipation.  It sounds like she has stopped taking MiraLax so recommended she get back to taking MiraLax once to twice a day.  If this does not work for her constipation she should let us know and we can either retry some more of the other constipation medications such as Amitza.  She may take magnesium citrate if she is significantly constipated.  I will check a KUB to ensure no evidence of any blockage.  It appears that she will not be able to tolerate low-dose PPI so we will go ahead and have her increase back to 30 mg a day and continue this for now.  I recommended she stop taking the Gaviscon as she thinks it may have caused some constipation but she can return to taking it as needed once her constipation is better.  I did recommend she also take an H2 blocker as needed in place of Gaviscon.  I will give her a trial of sucralfate to see if that may help her symptoms as well until her PPI is able to kick back in.  She was told to notify us if she is not better in the next couple of weeks and at that time we can consider repeat treatment of bacterial overgrowth, which has helped her in the past.  Also explained to the patient that her weight gain may also be playing a role and we discussed the importance of lifestyle modification, weight loss, exercise, etc.  She is interested in learning more about the ideal protein program.

## 2018-08-16 NOTE — SERVICEHPINOTES
Ms. Bellamy is a 68-year-old female with chronic GERD and constipation who is here for worsening symptoms. She states that she has had issues for several years. When I initially saw her she was have more issues with epigastric pain and indigestion. She states that she had a colonoscopy by Dr. Carson in 2014 that was normal. She also had an EGD by him in June 2016 that was negative with negative stomach, esophagus, and duodenal biopsies. There was mild narrowing of the esophagus and she underwent 50Fr dilation CT scan in June 2016 was normal except for diverticulosis and fatty liver. Abdominal x-ray around the same time was also normal, as was a chest x-ray. She underwent gastric emptying study in August 2016 that was normal. Blood work performed by her PCP in January 2017 revealed a normal CBC and BMP. In the past at [facility] she had an EGD in October 2013 that revealed a ring in the lower esophagus and a 3 cm hiatus hernia. Biopsies of the stomach only revealed reactive gastropathy was negative for H. pylori. She had another EGD in 2012 with negative esophageal and stomach biopsies as well. She has tried Reglan in the past which caused side effects. She has had other imaging in the past as well and has been negative. She states that she has had a positive H. pylori test in the past and was treated with antibiotics, but most recent EGD stomach biopsies were negative. When I initially saw her we checked basic blood work including liver enzymes and celiac labs which were all negative. Abdominal ultrasound only showed some fatty liver and benign kidney cysts. HIDA scan was normal with ejection fraction 59 percent. She did undergo lactulose breath test which did appear consistent with bacterial overgrowth. Unfortunately her insurance company denied use of Xifaxan so she was given a course of metronidazole. At the very end of treatment of this she did have some issues with hematuria and because of this went to [facility] and was treated with different antibiotics for a 10 day course. CT scan during her ED visit was completely normal, as was blood work. While taking her antibiotics for her urinary tract infection she started having worse diarrhea that persisted. Because of this she was seen by her PCP at the beginning of June 2017 where stool study confirmed that she was positive for C. difficile. Because of this she was started on metronidazole, which has since improved her symptoms. Because of some return of symptoms she underwent EGD and colonoscopy in October 2017 with EGD overall normal with negative stomach and duodenal biopsies. There was some mild lymphangiectasia. Esophageal biopsies were also negative. The colon was also normal to the terminal ileum with only a small polyp removed. It was hyperplastic. She states that she was told she had pre-cancerous polyps at some point and so her next colonoscopy is due in 2022. We did try to switch her to an H2 blocker but she states ranitidine caused constipation and tendinitis. Pepcid did not work either. We gave her Trulance in the past, however she states now she only eats an apple every day and tries to eat more fiber in her diet. She does take occasional NSAIDs for rheumatoid arthritis and has been weaned off of Rituxan. When I last saw the patient she was doing very well with constipation controlled with MiraLax and fiber and reflux controlled on lansoprazole 30 mg daily.  We decided to try to cut back on lansoprazole to 15 mg a day.  The patient states that she has done well up until about two weeks ago when she started having some worse issues with indigestion, reflux, bloating, and gas.  She also has had some nausea.  She was taking Gaviscon more regularly and thinks that this may have cause more constipation issues.  She now is having small, incomplete bowel movements every day.  She also states that she recently ate pizza which also set off her symptoms.  She has gained 10 lb since our last visit.  She denies any fevers or chills.  She has gone back to taking lansoprazole 30 mg over the past week and notes that it is somewhat better but she is still having issues with nausea and indigestion as well as constipation.LYNNETTE

## 2019-02-12 ENCOUNTER — OFFICE (OUTPATIENT)
Dept: URBAN - METROPOLITAN AREA CLINIC 12 | Facility: CLINIC | Age: 69
End: 2019-02-12
Payer: OTHER GOVERNMENT

## 2019-02-12 VITALS
DIASTOLIC BLOOD PRESSURE: 82 MMHG | WEIGHT: 223 LBS | SYSTOLIC BLOOD PRESSURE: 128 MMHG | HEART RATE: 68 BPM | HEIGHT: 64 IN

## 2019-02-12 DIAGNOSIS — K58.9 IRRITABLE BOWEL SYNDROME WITHOUT DIARRHEA: ICD-10-CM

## 2019-02-12 DIAGNOSIS — M06.9 RHEUMATOID ARTHRITIS, UNSPECIFIED: ICD-10-CM

## 2019-02-12 DIAGNOSIS — K21.9 GASTRO-ESOPHAGEAL REFLUX DISEASE WITHOUT ESOPHAGITIS: ICD-10-CM

## 2019-02-12 DIAGNOSIS — A04.9 BACTERIAL INTESTINAL INFECTION, UNSPECIFIED: ICD-10-CM

## 2019-02-12 DIAGNOSIS — R19.4 CHANGE IN BOWEL HABIT: ICD-10-CM

## 2019-02-12 DIAGNOSIS — R14.0 ABDOMINAL DISTENSION (GASEOUS): ICD-10-CM

## 2019-02-12 PROCEDURE — 99213 OFFICE O/P EST LOW 20 MIN: CPT | Performed by: INTERNAL MEDICINE

## 2019-02-12 NOTE — SERVICEHPINOTES
Ms. Bellamy is a 68-year-old female with chronic GERD and constipation who is here for worsening symptoms. She states that she has had issues for several years. When I initially saw her she was have more issues with epigastric pain and indigestion. She states that she had a colonoscopy by Dr. Carson in 2014 that was normal. She also had an EGD by him in June 2016 that was negative with negative stomach, esophagus, and duodenal biopsies. There was mild narrowing of the esophagus and she underwent 50Fr dilation CT scan in June 2016 was normal except for diverticulosis and fatty liver. Abdominal x-ray around the same time was also normal, as was a chest x-ray. She underwent gastric emptying study in August 2016 that was normal. Blood work performed by her PCP in January 2017 revealed a normal CBC and BMP. In the past at [facility] she had an EGD in October 2013 that revealed a ring in the lower esophagus and a 3 cm hiatus hernia. Biopsies of the stomach only revealed reactive gastropathy was negative for H. pylori. She had another EGD in 2012 with negative esophageal and stomach biopsies as well. She has tried Reglan in the past which caused side effects. She has had other imaging in the past as well and has been negative. She states that she has had a positive H. pylori test in the past and was treated with antibiotics, but most recent EGD stomach biopsies were negative. When I initially saw her we checked basic blood work including liver enzymes and celiac labs which were all negative. Abdominal ultrasound only showed some fatty liver and benign kidney cysts. HIDA scan was normal with ejection fraction 59 percent. She did undergo lactulose breath test which did appear consistent with bacterial overgrowth. Unfortunately her insurance company denied use of Xifaxan so she was given a course of metronidazole. At the very end of treatment of this she did have some issues with hematuria and because of this went to [facility] and was treated with different antibiotics for a 10 day course. CT scan during her ED visit was completely normal, as was blood work. While taking her antibiotics for her urinary tract infection she started having worse diarrhea that persisted. Because of this she was seen by her PCP at the beginning of June 2017 where stool study confirmed that she was positive for C. difficile. Because of this she was started on metronidazole, which has since improved her symptoms. Because of some return of symptoms she underwent EGD and colonoscopy in October 2017 with EGD overall normal with negative stomach and duodenal biopsies. There was some mild lymphangiectasia. Esophageal biopsies were also negative. The colon was also normal to the terminal ileum with only a small polyp removed. It was hyperplastic. She states that she was told she had pre-cancerous polyps at some point and so her next colonoscopy is due in 2022. We did try to switch her to an H2 blocker but she states ranitidine caused constipation and tendinitis. Pepcid did not work either. We gave her Trulance in the past, however she states now she only eats an apple every day and tries to eat more fiber in her diet. She does take occasional NSAIDs for rheumatoid arthritis and has been weaned off of Rituxan. Overall she has been doing better. We did try to cut back on lansoprazole to 15 mg a day and take Gaviscon as needed but because of this she was having some nausea so we had her go back to taking lansoprazole 30 mg a day. Her weight is stable. Her constipation was controlled with fiber and MiraLax but she states that she was having some more issues recently with some constipation and so started to take some Dulcolax and since then has had some loose bowel movements. She otherwise denies any fevers, chills, nausea, or vomiting. We did give her sucralfate when I last saw her but she has not been taking this. When I last saw her also recommended she take a probiotic but does not appear that she was taking this either. She denies recent antibiotics.BR

## 2019-02-12 NOTE — SERVICENOTES
Overall she is doing fair.  Her reflux is under good control with lansoprazole also recommended that she continue this as she did have worse symptoms we tried to cut back.  I did recommend she consider holding off on taking MiraLax for now since she has had a change in bowel habits and start taking over-the-counter stool softener.  I did also discuss the possibility of Linzess or Trulance for her to take if her constipation does not improve and I did recommend she also take a probiotic as above.  Her loose bowel movements are likely related to the fact that she may have overdosed a little on laxatives.  If this does not resolve upon cutting back on laxatives then I recommend that she notify us we may consider checking stool studies.  She was otherwise instructed to notify us if she has any new or worsening symptoms in the meantime.  We can also consider another trial of Xifaxan if she starts having worsening symptoms.

## 2019-08-13 ENCOUNTER — OFFICE (OUTPATIENT)
Dept: URBAN - METROPOLITAN AREA CLINIC 12 | Facility: CLINIC | Age: 69
End: 2019-08-13
Payer: COMMERCIAL

## 2019-08-13 VITALS
WEIGHT: 224 LBS | DIASTOLIC BLOOD PRESSURE: 80 MMHG | SYSTOLIC BLOOD PRESSURE: 140 MMHG | HEART RATE: 67 BPM | HEIGHT: 64 IN

## 2019-08-13 DIAGNOSIS — K58.1 IRRITABLE BOWEL SYNDROME WITH CONSTIPATION: ICD-10-CM

## 2019-08-13 DIAGNOSIS — A04.9 BACTERIAL INTESTINAL INFECTION, UNSPECIFIED: ICD-10-CM

## 2019-08-13 DIAGNOSIS — R14.0 ABDOMINAL DISTENSION (GASEOUS): ICD-10-CM

## 2019-08-13 DIAGNOSIS — K21.9 GASTRO-ESOPHAGEAL REFLUX DISEASE WITHOUT ESOPHAGITIS: ICD-10-CM

## 2019-08-13 PROCEDURE — 99213 OFFICE O/P EST LOW 20 MIN: CPT | Performed by: INTERNAL MEDICINE

## 2019-08-13 RX ORDER — LANSOPRAZOLE 30 MG/1
CAPSULE, DELAYED RELEASE PELLETS ORAL
Qty: 90 | Refills: 3 | Status: ACTIVE

## 2019-08-13 NOTE — SERVICENOTES
Overall the patient is doing well.  She is willing to go back down to a low-dose PPI.  She has tried this in the past and did have some issues but states that overall she thinks that it will work well for her as long she watches her diet.  If it does not work for her we can always go back to the 30 mg dose.  The patient is now minimal to a trial of low-dose Linzess we will give her some sample as above.  She was instructed to let us know whether not works so we can call in a prescription.  If it does not work we can always go up on the dose or consider trial of Amitza.  Otherwise, the patient can always just go back to taking MiraLax on a regular basis as well. Since she has been doing well we will see her back in a year but she was instructed to notify us if she has any new or worsening symptoms in the meantime.  If she does have more persistent symptoms with bloating and generalized abdominal discomfort with decent bowel movements we can always consider another course of Xifaxan.

## 2019-08-13 NOTE — SERVICEHPINOTES
Ms. Bellamy is a 69-year-old female with chronic GERD and constipation who is here for worsening symptoms. She states that she has had issues for several years. When I initially saw her she was have more issues with epigastric pain and indigestion. She states that she had a colonoscopy by Dr. Carson in 2014 that was normal. She also had an EGD by him in June 2016 that was negative with negative stomach, esophagus, and duodenal biopsies. There was mild narrowing of the esophagus and she underwent 50Fr dilation CT scan in June 2016 was normal except for diverticulosis and fatty liver. Abdominal x-ray around the same time was also normal, as was a chest x-ray. She underwent gastric emptying study in August 2016 that was normal. Blood work performed by her PCP in January 2017 revealed a normal CBC and BMP. In the past at [facility] she had an EGD in October 2013 that revealed a ring in the lower esophagus and a 3 cm hiatus hernia. Biopsies of the stomach only revealed reactive gastropathy was negative for H. pylori. She had another EGD in 2012 with negative esophageal and stomach biopsies as well. She has tried Reglan in the past which caused side effects. She has had other imaging in the past as well and has been negative. She states that she has had a positive H. pylori test in the past and was treated with antibiotics, but most recent EGD stomach biopsies were negative. When I initially saw her we checked basic blood work including liver enzymes and celiac labs which were all negative. Abdominal ultrasound only showed some fatty liver and benign kidney cysts. HIDA scan was normal with ejection fraction 59 percent. She did undergo lactulose breath test which did appear consistent with bacterial overgrowth. Unfortunately her insurance company denied use of Xifaxan so she was given a course of metronidazole. At the very end of treatment of this she did have some issues with hematuria and because of this went to [facility] and was treated with different antibiotics for a 10 day course. CT scan during her ED visit was completely normal, as was blood work. While taking her antibiotics for her urinary tract infection she started having worse diarrhea that persisted. Because of this she was seen by her PCP at the beginning of June 2017 where stool study confirmed that she was positive for C. difficile. Because of this she was started on metronidazole, which has since improved her symptoms. Because of some return of symptoms she underwent EGD and colonoscopy in October 2017 with EGD overall normal with negative stomach and duodenal biopsies. There was some mild lymphangiectasia. Esophageal biopsies were also negative. The colon was also normal to the terminal ileum with only a small polyp removed. It was hyperplastic. She states that she was told she had pre-cancerous polyps at some point and so her next colonoscopy is due in 2022. We did try to switch her to an H2 blocker but she states ranitidine caused constipation and tendinitis. Pepcid did not work either. We gave her Trulance in the past which she did not like. She does take occasional NSAIDs for rheumatoid arthritis and has been weaned off of Rituxan. Interim History:LYNNETTEThe patient comes in today overall doing well. She states that her reflux is under good control. She ran out of the 15 mg lansoprazole now has been taking the 30 mg lansoprazole. She will have occasional breakthrough whenever she has dietary indiscretion such as eating pizza, etc. Her weight is stable. She states that her constipation is under good control for the most part as long as she takes MiraLax every other day but sometimes she will forget and will have a flare of constipation associated with bloating. When this happened she takes stool softeners. She denies any fevers or chills. She did have some bloating a few weeks ago associated some crampy abdominal pain but this was able to subside after her bowel movements improved.LYNNETTE

## 2020-08-06 VITALS — HEIGHT: 64 IN

## 2020-08-31 ENCOUNTER — OFFICE (OUTPATIENT)
Dept: URBAN - METROPOLITAN AREA TELEHEALTH 10 | Facility: TELEHEALTH | Age: 70
End: 2020-08-31
Payer: COMMERCIAL

## 2020-08-31 DIAGNOSIS — K59.00 CONSTIPATION, UNSPECIFIED: ICD-10-CM

## 2020-08-31 DIAGNOSIS — K21.9 GASTRO-ESOPHAGEAL REFLUX DISEASE WITHOUT ESOPHAGITIS: ICD-10-CM

## 2020-08-31 RX ORDER — LANSOPRAZOLE 30 MG/1
CAPSULE, DELAYED RELEASE PELLETS ORAL
Qty: 90 | Refills: 3 | Status: ACTIVE

## 2020-08-31 NOTE — SERVICEHPINOTES
Ms. Bellamy is a 70-year-old female with chronic GERD and constipation who is here for follow up. Overall the patient has been doing well since we last saw her. She has been taking Prevacid 30 mg daily. She did try to stop this intake famotidine once a day which helps somewhat but was not as good. She admits that she has gained weight over the past year. She also states that her constipation is doing much better as long as she takes an apple every day. It is worse whenever she eats cheese. She will supplement with MiraLax occasionally. She otherwise denies any fevers, chills, nausea, or vomiting. There is no rectal bleeding.Previous GI History:BRWhen I initially saw her she was have more issues with epigastric pain and indigestion. She states that she had a colonoscopy by Dr. Carson in 2014 that was normal. She also had an EGD by him in June 2016 that was negative with negative stomach, esophagus, and duodenal biopsies. There was mild narrowing of the esophagus and she underwent 50Fr dilation CT scan in June 2016 was normal except for diverticulosis and fatty liver. Abdominal x-ray around the same time was also normal, as was a chest x-ray. She underwent gastric emptying study in August 2016 that was normal. Blood work performed by her PCP in January 2017 revealed a normal CBC and BMP. In the past at [facility] she had an EGD in October 2013 that revealed a ring in the lower esophagus and a 3 cm hiatus hernia. Biopsies of the stomach only revealed reactive gastropathy was negative for H. pylori. She had another EGD in 2012 with negative esophageal and stomach biopsies as well. She has tried Reglan in the past which caused side effects. She has had other imaging in the past as well and has been negative. She states that she has had a positive H. pylori test in the past and was treated with antibiotics, but most recent EGD stomach biopsies were negative. When I initially saw her we checked basic blood work including liver enzymes and celiac labs which were all negative. Abdominal ultrasound only showed some fatty liver and benign kidney cysts. HIDA scan was normal with ejection fraction 59 percent. She did undergo lactulose breath test which did appear consistent with bacterial overgrowth. Unfortunately her insurance company denied use of Xifaxan so she was given a course of metronidazole. At the very end of treatment of this she did have some issues with hematuria and because of this went to [facility] and was treated with different antibiotics for a 10 day course. CT scan during her ED visit was completely normal, as was blood work. While taking her antibiotics for her urinary tract infection she started having worse diarrhea that persisted. Because of this she was seen by her PCP at the beginning of June 2017 where stool study confirmed that she was positive for C. difficile. Because of this she was started on metronidazole, which has since improved her symptoms. Because of some return of symptoms she underwent EGD and colonoscopy in October 2017 with EGD overall normal with negative stomach and duodenal biopsies. There was some mild lymphangiectasia. Esophageal biopsies were also negative. The colon was also normal to the terminal ileum with only a small polyp removed. It was hyperplastic. She states that she was told she had pre-cancerous polyps at some point and so her next colonoscopy is due in 2022. We did try to switch her to an H2 blocker but she states ranitidine caused constipation and tendinitis. We gave her Trulance in the past which she did not like. She does take occasional NSAIDs for rheumatoid arthritis and has been weaned off of Rituxan.

## 2020-08-31 NOTE — SERVICENOTES
The patient is aware this visit will be billed., The duration of the telehealth visit was 5 minutes and 19 seconds.

## 2021-08-24 ENCOUNTER — OFFICE (OUTPATIENT)
Dept: URBAN - METROPOLITAN AREA CLINIC 12 | Facility: CLINIC | Age: 71
End: 2021-08-24
Payer: COMMERCIAL

## 2021-08-24 VITALS
SYSTOLIC BLOOD PRESSURE: 106 MMHG | HEART RATE: 122 BPM | DIASTOLIC BLOOD PRESSURE: 74 MMHG | WEIGHT: 230 LBS | OXYGEN SATURATION: 98 % | HEIGHT: 64 IN

## 2021-08-24 DIAGNOSIS — K59.00 CONSTIPATION, UNSPECIFIED: ICD-10-CM

## 2021-08-24 DIAGNOSIS — K62.5 HEMORRHAGE OF ANUS AND RECTUM: ICD-10-CM

## 2021-08-24 PROCEDURE — 99214 OFFICE O/P EST MOD 30 MIN: CPT | Performed by: INTERNAL MEDICINE

## 2021-08-24 RX ORDER — POLYETHYLENE GLYCOL 3350, SODIUM SULFATE, SODIUM CHLORIDE, POTASSIUM CHLORIDE, ASCORBIC ACID, SODIUM ASCORBATE 140-9-5.2G
KIT ORAL
Qty: 1 | Refills: 0 | Status: COMPLETED
Start: 2021-08-24 | End: 2022-03-16

## 2021-08-24 RX ORDER — HYDROCORTISONE 25 MG/G
CREAM TOPICAL
Qty: 1 | Refills: 0 | Status: COMPLETED
Start: 2021-08-24 | End: 2023-02-07

## 2021-08-24 NOTE — SERVICEHPINOTES
Ms. Bellamy is a 71 yr old female who presents today with complaint of constipation and rectal bleeding.  She has a history of constipation which has been well controlled with MiraLax and daily fiber in the past she stated for the past 3 weeks her constipation has increased despite taking MiraLax, Metamucil and Dulcolax.  She states she has 2-3 bowel movements a day but describes them as a small amount and "pencil like".  She states that she drinks plenty of water daily.  Her diet consists of high-fiber foods including apples and prunes.  She states she is active during the day.  She is not started any new medications or change her diet prior to with new onset of constipation. Yesterday, after bowel movement she noticed blood on her toilet paper after wiping.  She states there is no blood in the toilet.  She states she has been straining and pressing down abdomen to try to help with bowel movements.  She denies any severe rectal pain.  She states when she has a bowel movement it is more uncomfortable than painful.  She has noticed an increase in flatulence since starting the Metamucil and MiraLax.  She currently takes 1 dose Metamucil in the morning and 1 dose of MiraLax in the evening.  Her last colonoscopy was in 2017 which revealed 1 polyp he and stage I internal hemorrhoids.  Her next colonoscopy is due in 2022. She currently denies any fever, chills, nausea, vomiting, diarrhea or abdominal pain.

## 2021-08-24 NOTE — SERVICENOTES
MD Addendum: The patient was seen along with Brittney Chatterjee NP.  I have evaluated the patient, discussed with NP, and agree with the above documented findings, impression, and plan of care.  On rectal exam today the patient had brown stool with no evidence of any blood.  There is no distal mass.  She had a change in bowel habits over the past few weeks and had acute onset of rectal bleeding yesterday which has since resolved.  This is likely be due to hemorrhoids or fissure due to her straining.  With that said, given that she has not had a colonoscopy in the past for years and she has had a change in bowel habits I think it is reasonable that she undergo colonoscopy after we obtain cardiac clearance.  The patient is amenable to this plan.-ANNA

## 2022-03-16 ENCOUNTER — OFFICE (OUTPATIENT)
Dept: URBAN - METROPOLITAN AREA CLINIC 11 | Facility: CLINIC | Age: 72
End: 2022-03-16
Payer: COMMERCIAL

## 2022-03-16 VITALS
SYSTOLIC BLOOD PRESSURE: 135 MMHG | HEIGHT: 64 IN | HEART RATE: 67 BPM | DIASTOLIC BLOOD PRESSURE: 64 MMHG | WEIGHT: 238 LBS | OXYGEN SATURATION: 99 %

## 2022-03-16 DIAGNOSIS — K59.00 CONSTIPATION, UNSPECIFIED: ICD-10-CM

## 2022-03-16 DIAGNOSIS — K21.9 GASTRO-ESOPHAGEAL REFLUX DISEASE WITHOUT ESOPHAGITIS: ICD-10-CM

## 2022-03-16 PROCEDURE — 99214 OFFICE O/P EST MOD 30 MIN: CPT

## 2022-03-16 RX ORDER — LANSOPRAZOLE 30 MG/1
CAPSULE, DELAYED RELEASE PELLETS ORAL
Qty: 90 | Refills: 3 | Status: ACTIVE

## 2022-03-16 NOTE — SERVICENOTES
Pt here for follow up today for constipation and Gerd. Gerd is well controlled with current medication and H2 blockers prn. Her dysphagia has worsened over the last few months with taking pills. She states she does not have any issues with food if she eats small bites and chews thoroughly. Her last EGD was in 2017. She states that she has had esophageal dilation before. We discussed her needing another EGD to further assess her worsening dysphagia. She agreed. She is still having issues with constipation. She is only taking her Miralax every other day and her Metamucil every 3 days. I informed her that she needs to take both of them every day. If that doesn't work, I discussed that we could try linzess, trulance, amitiza or motegrity next. She wants to try the daily Miralax and Metamucil first. She was scheduled for a colonoscopy after her last OV but was unable to have the procedure completed due to not finding a .  I ordered her colonoscopy again. She plans to find a  now and schedule both the colonoscopy and EGD together. She still has her prep from her last OV.

## 2022-03-16 NOTE — SERVICEHPINOTES
Ms. Bellamy is a 71 yr old female who presents today for follow up for constipation and Gerd. Since she was last seen in August 2021, she states that her dysphagia has worsened. She has difficulty daily with swallowing her pills which she will either cough up when they become dislodged or drinking water for them to pass. She states she will also have difficulty with her food if she does not eat in small bites. Her last EGD was in 2017. She states that her Gerd is well controlled with Lansoprazole 15 mg daily. She will occasionally have breakthrough acid reflux if she eats foods such as spicy foods or tomato based foods. When this occurs, she will take OTC Pepcid which relieves her symptoms. She denies any symptoms of nausea or vomiting. Her constipation symptoms have remained the same. She currently will have one formed BM every day to every 3 days. She does admit to straining and occasionally will have rectal bleeding after her straining episodes. She currently is taking Miralax every other day and Metamucil every 3 days. She does admit to drinking large amounts of water and drinking prune juice daily. She will occasionally have bilateral lower abdominal discomfort when she is constipated. The discomfort resolves with her bowel movements. She had a colonoscopy ordered after her last office visit but has been unable to schedule due to not having a . She is aware that we recommend for her to have a colonoscopy. She currently does not take any blood thinners. She does see Dr. Verdin for cardiology due to HTN and HLD. She has a history of sleep apnea and wears a CPAP machine at night with 6L O2. Her sleep apnea is managed by Dr. Gastelum. elicia rubi PMHX:Cassy has a history of chronic GERD and constipation who is here for worsening symptoms. She states that she has had issues for several years. When I initially saw her she was have more issues with epigastric pain and indigestion. She states that she had a colonoscopy by Dr. Carson in 2014 that was normal. She also had an EGD by him in June 2016 that was negative with negative stomach, esophagus, and duodenal biopsies. There was mild narrowing of the esophagus and she underwent 50Fr dilation CT scan in June 2016 was normal except for diverticulosis and fatty liver. Abdominal x-ray around the same time was also normal, as was a chest x-ray. She underwent gastric emptying study in August 2016 that was normal. Blood work performed by her PCP in January 2017 revealed a normal CBC and BMP. In the past at [facility] she had an EGD in October 2013 that revealed a ring in the lower esophagus and a 3 cm hiatus hernia. Biopsies of the stomach only revealed reactive gastropathy was negative for H. pylori. She had another EGD in 2012 with negative esophageal and stomach biopsies as well. She has tried Reglan in the past which caused side effects. She has had other imaging in the past as well and has been negative. She states that she has had a positive H. pylori test in the past and was treated with antibiotics, but most recent EGD stomach biopsies were negative. When I initially saw her we checked basic blood work including liver enzymes and celiac labs which were all negative. Abdominal ultrasound only showed some fatty liver and benign kidney cysts. HIDA scan was normal with ejection fraction 59 percent. She did undergo lactulose breath test which did appear consistent with bacterial overgrowth. Unfortunately her insurance company denied use of Xifaxan so she was given a course of metronidazole. At the very end of treatment of this she did have some issues with hematuria and because of this went to [facility] and was treated with different antibiotics for a 10 day course. CT scan during her ED visit was completely normal, as was blood work. While taking her antibiotics for her urinary tract infection she started having worse diarrhea that persisted. Because of this she was seen by her PCP at the beginning of June 2017 where stool study confirmed that she was positive for C. difficile. Because of this she was started on metronidazole, which has since improved her symptoms. Because of some return of symptoms she underwent EGD and colonoscopy in October 2017 with EGD overall normal with negative stomach and duodenal biopsies. There was some mild lymphangiectasia. Esophageal biopsies were also negative. The colon was also normal to the terminal ileum with only a small polyp removed. It was hyperplastic. She states that she was told she had pre-cancerous polyps at some point and so her next colonoscopy is due in 2022. We did try to switch her to an H2 blocker but she states ranitidine caused constipation and tendinitis. Pepcid did not work either. We gave her Trulance in the past which she did not like. She does take occasional NSAIDs for rheumatoid arthritis and has been weaned off of Rituxan.

## 2022-05-24 ENCOUNTER — AMBULATORY SURGICAL CENTER (OUTPATIENT)
Dept: URBAN - METROPOLITAN AREA SURGERY 3 | Facility: SURGERY | Age: 72
End: 2022-05-24
Payer: COMMERCIAL

## 2022-05-24 ENCOUNTER — OFFICE (OUTPATIENT)
Dept: URBAN - METROPOLITAN AREA PATHOLOGY 22 | Facility: PATHOLOGY | Age: 72
End: 2022-05-24
Payer: COMMERCIAL

## 2022-05-24 VITALS
DIASTOLIC BLOOD PRESSURE: 73 MMHG | DIASTOLIC BLOOD PRESSURE: 98 MMHG | WEIGHT: 238 LBS | DIASTOLIC BLOOD PRESSURE: 77 MMHG | RESPIRATION RATE: 21 BRPM | RESPIRATION RATE: 21 BRPM | HEART RATE: 77 BPM | HEART RATE: 76 BPM | HEART RATE: 69 BPM | RESPIRATION RATE: 18 BRPM | HEIGHT: 64 IN | RESPIRATION RATE: 23 BRPM | TEMPERATURE: 97.4 F | DIASTOLIC BLOOD PRESSURE: 78 MMHG | SYSTOLIC BLOOD PRESSURE: 128 MMHG | RESPIRATION RATE: 22 BRPM | OXYGEN SATURATION: 95 % | DIASTOLIC BLOOD PRESSURE: 77 MMHG | RESPIRATION RATE: 22 BRPM | HEART RATE: 86 BPM | DIASTOLIC BLOOD PRESSURE: 98 MMHG | OXYGEN SATURATION: 97 % | RESPIRATION RATE: 21 BRPM | RESPIRATION RATE: 18 BRPM | HEIGHT: 64 IN | OXYGEN SATURATION: 97 % | SYSTOLIC BLOOD PRESSURE: 131 MMHG | TEMPERATURE: 97.4 F | OXYGEN SATURATION: 97 % | HEART RATE: 86 BPM | DIASTOLIC BLOOD PRESSURE: 77 MMHG | RESPIRATION RATE: 22 BRPM | OXYGEN SATURATION: 95 % | SYSTOLIC BLOOD PRESSURE: 119 MMHG | OXYGEN SATURATION: 95 % | HEART RATE: 69 BPM | SYSTOLIC BLOOD PRESSURE: 131 MMHG | RESPIRATION RATE: 18 BRPM | HEIGHT: 64 IN | SYSTOLIC BLOOD PRESSURE: 125 MMHG | SYSTOLIC BLOOD PRESSURE: 119 MMHG | DIASTOLIC BLOOD PRESSURE: 78 MMHG | SYSTOLIC BLOOD PRESSURE: 128 MMHG | TEMPERATURE: 97.1 F | HEART RATE: 76 BPM | SYSTOLIC BLOOD PRESSURE: 128 MMHG | RESPIRATION RATE: 23 BRPM | DIASTOLIC BLOOD PRESSURE: 78 MMHG | TEMPERATURE: 97.1 F | HEART RATE: 70 BPM | HEART RATE: 70 BPM | HEART RATE: 77 BPM | RESPIRATION RATE: 23 BRPM | SYSTOLIC BLOOD PRESSURE: 125 MMHG | TEMPERATURE: 97.1 F | SYSTOLIC BLOOD PRESSURE: 131 MMHG | SYSTOLIC BLOOD PRESSURE: 125 MMHG | WEIGHT: 238 LBS | DIASTOLIC BLOOD PRESSURE: 98 MMHG | HEART RATE: 69 BPM | TEMPERATURE: 97.4 F | HEART RATE: 77 BPM | DIASTOLIC BLOOD PRESSURE: 73 MMHG | SYSTOLIC BLOOD PRESSURE: 119 MMHG | HEART RATE: 76 BPM | HEART RATE: 86 BPM | WEIGHT: 238 LBS | HEART RATE: 70 BPM | DIASTOLIC BLOOD PRESSURE: 73 MMHG

## 2022-05-24 DIAGNOSIS — K63.5 POLYP OF COLON: ICD-10-CM

## 2022-05-24 DIAGNOSIS — K29.50 UNSPECIFIED CHRONIC GASTRITIS WITHOUT BLEEDING: ICD-10-CM

## 2022-05-24 DIAGNOSIS — K57.30 DIVERTICULOSIS OF LARGE INTESTINE WITHOUT PERFORATION OR ABS: ICD-10-CM

## 2022-05-24 DIAGNOSIS — R13.10 DYSPHAGIA, UNSPECIFIED: ICD-10-CM

## 2022-05-24 PROBLEM — K64.1 SECOND DEGREE HEMORRHOIDS: Status: ACTIVE | Noted: 2022-05-24

## 2022-05-24 PROBLEM — R10.30 LOWER ABDOMINAL PAIN, UNSPECIFIED: Status: ACTIVE | Noted: 2022-05-24

## 2022-05-24 PROBLEM — K92.1 HEMATOCHEZIA: Status: ACTIVE | Noted: 2022-05-24

## 2022-05-24 PROBLEM — Q39.4 ESOPHAGEAL WEB: Status: ACTIVE | Noted: 2022-05-24

## 2022-05-24 PROBLEM — K31.89 OTHER DISEASES OF STOMACH AND DUODENUM: Status: ACTIVE | Noted: 2022-05-24

## 2022-05-24 PROCEDURE — 45385 COLONOSCOPY W/LESION REMOVAL: CPT | Performed by: INTERNAL MEDICINE

## 2022-05-24 PROCEDURE — 43239 EGD BIOPSY SINGLE/MULTIPLE: CPT | Mod: 59 | Performed by: INTERNAL MEDICINE

## 2022-05-24 PROCEDURE — 43248 EGD GUIDE WIRE INSERTION: CPT | Mod: 51 | Performed by: INTERNAL MEDICINE

## 2022-05-24 PROCEDURE — 88342 IMHCHEM/IMCYTCHM 1ST ANTB: CPT | Performed by: STUDENT IN AN ORGANIZED HEALTH CARE EDUCATION/TRAINING PROGRAM

## 2022-05-24 PROCEDURE — 88305 TISSUE EXAM BY PATHOLOGIST: CPT | Performed by: STUDENT IN AN ORGANIZED HEALTH CARE EDUCATION/TRAINING PROGRAM

## 2022-05-24 PROCEDURE — 88313 SPECIAL STAINS GROUP 2: CPT | Performed by: STUDENT IN AN ORGANIZED HEALTH CARE EDUCATION/TRAINING PROGRAM

## 2022-05-24 PROCEDURE — G8918 PT W/O PREOP ORDER IV AB PRO: HCPCS | Performed by: INTERNAL MEDICINE

## 2023-02-07 ENCOUNTER — OFFICE (OUTPATIENT)
Dept: URBAN - METROPOLITAN AREA CLINIC 12 | Facility: CLINIC | Age: 73
End: 2023-02-07
Payer: COMMERCIAL

## 2023-02-07 VITALS
WEIGHT: 232 LBS | SYSTOLIC BLOOD PRESSURE: 140 MMHG | DIASTOLIC BLOOD PRESSURE: 88 MMHG | HEART RATE: 97 BPM | HEIGHT: 64 IN | OXYGEN SATURATION: 97 %

## 2023-02-07 DIAGNOSIS — R14.0 ABDOMINAL DISTENSION (GASEOUS): ICD-10-CM

## 2023-02-07 DIAGNOSIS — K21.9 GASTRO-ESOPHAGEAL REFLUX DISEASE WITHOUT ESOPHAGITIS: ICD-10-CM

## 2023-02-07 DIAGNOSIS — K58.9 IRRITABLE BOWEL SYNDROME WITHOUT DIARRHEA: ICD-10-CM

## 2023-02-07 DIAGNOSIS — K59.00 CONSTIPATION, UNSPECIFIED: ICD-10-CM

## 2023-02-07 DIAGNOSIS — R13.10 DYSPHAGIA, UNSPECIFIED: ICD-10-CM

## 2023-02-07 DIAGNOSIS — A04.9 BACTERIAL INTESTINAL INFECTION, UNSPECIFIED: ICD-10-CM

## 2023-02-07 PROCEDURE — 99214 OFFICE O/P EST MOD 30 MIN: CPT | Performed by: INTERNAL MEDICINE

## 2023-02-07 RX ORDER — LANSOPRAZOLE 30 MG/1
CAPSULE, DELAYED RELEASE PELLETS ORAL
Qty: 90 | Refills: 3 | Status: ACTIVE

## 2023-09-12 ENCOUNTER — OFFICE (OUTPATIENT)
Dept: URBAN - METROPOLITAN AREA CLINIC 12 | Facility: CLINIC | Age: 73
End: 2023-09-12
Payer: COMMERCIAL

## 2023-09-12 VITALS
HEIGHT: 64 IN | WEIGHT: 233 LBS | DIASTOLIC BLOOD PRESSURE: 77 MMHG | SYSTOLIC BLOOD PRESSURE: 123 MMHG | HEART RATE: 79 BPM | OXYGEN SATURATION: 97 %

## 2023-09-12 DIAGNOSIS — R14.0 ABDOMINAL DISTENSION (GASEOUS): ICD-10-CM

## 2023-09-12 DIAGNOSIS — K21.9 GASTRO-ESOPHAGEAL REFLUX DISEASE WITHOUT ESOPHAGITIS: ICD-10-CM

## 2023-09-12 DIAGNOSIS — K59.00 CONSTIPATION, UNSPECIFIED: ICD-10-CM

## 2023-09-12 DIAGNOSIS — R13.10 DYSPHAGIA, UNSPECIFIED: ICD-10-CM

## 2023-09-12 PROCEDURE — 99214 OFFICE O/P EST MOD 30 MIN: CPT

## 2023-09-12 NOTE — SERVICEHPINOTES
Dipti Bellamy   is a   73   year old  female   here today for bloating and "stomach gurgling". Patient states that she feels bloated and her stomach is "gurgling". She also continues to have constipations. She admits not to take the MiraLAX daily but when she does it helps. She also admits to drink Gingerale in a regular basis. She did have a CT scan done by her gynecologist on 6/19 of this year because of pelvic pain and it was unremarkable except for colonic diverticulosis. She continues to experience dysphagia. She was scheduled for esophageal manometry in March but she was unable to tolerate it. She continues to take her Lansoprazole daily. She denies any fever, chills, nausea, vomiting, abdominal pain, weight loss, loss of appetite, melena, or hematochezia. She is due for next colonoscopy 2032.   Previous GI History:Cassy has a history of chronic GERD and constipation who is here for worsening symptoms. She states that she has had issues for several years. When I initially saw her she was have more issues with epigastric pain and indigestion. She states that she had a colonoscopy by Dr. Carson in 2014 that was normal. She also had an EGD by him in June 2016 that was negative with negative stomach, esophagus, and duodenal biopsies. There was mild narrowing of the esophagus and she underwent 50Fr dilation CT scan in June 2016 was normal except for diverticulosis and fatty liver. Abdominal x-ray around the same time was also normal, as was a chest x-ray. She underwent gastric emptying study in August 2016 that was normal. Blood work performed by her PCP in January 2017 revealed a normal CBC and BMP. In the past at [facility] she had an EGD in October 2013 that revealed a ring in the lower esophagus and a 3 cm hiatus hernia. Biopsies of the stomach only revealed reactive gastropathy was negative for H. pylori. She had another EGD in 2012 with negative esophageal and stomach biopsies as well. She has tried Reglan in the past which caused side effects. She has had other imaging in the past as well and has been negative. She states that she has had a positive H. pylori test in the past and was treated with antibiotics, but most recent EGD stomach biopsies were negative. When I initially saw her we checked basic blood work including liver enzymes and celiac labs which were all negative. Abdominal ultrasound only showed some fatty liver and benign kidney cysts. HIDA scan was normal with ejection fraction 59 percent. She did undergo lactulose breath test which did appear consistent with bacterial overgrowth. Unfortunately her insurance company denied use of Xifaxan so she was given a course of metronidazole. At the very end of treatment of this she did have some issues with hematuria and because of this went to [facility] and was treated with different antibiotics for a 10 day course. CT scan during her ED visit was completely normal, as was blood work. While taking her antibiotics for her urinary tract infection she started having worse diarrhea that persisted. Because of this she was seen by her PCP at the beginning of June 2017 where stool study confirmed that she was positive for C. difficile. Because of this she was started on metronidazole, which has since improved her symptoms. Because of some return of symptoms she underwent EGD and colonoscopy in October 2017 with EGD overall normal with negative stomach and duodenal biopsies. There was some mild lymphangiectasia. Esophageal biopsies were also negative. The colon was also normal to the terminal ileum with only a small polyp removed. It was hyperplastic. She states that she was told she had pre-cancerous polyps at some point and so her next colonoscopy is due in 2022. We did try to switch her to an H2 blocker but she states ranitidine caused constipation and tendinitis. Pepcid did not work either. We gave her Trulance in the past which she did not like. She does take occasional NSAIDs for rheumatoid arthritis and has been weaned off of Rituxan. Last year she was having some more issues with dysphagia and so because of this she underwent EGD along with colonoscopy due to rectal bleeding and change in bowel habits in May 2022. 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 Of note, she has had negative esophageal biopsies in the past. There was some mild gastritis with biopsies unremarkable. On colonoscopy there was some diverticulosis but the colonoscopy was otherwise normal to the terminal ileum with removal of a small hyperplastic polyp and so her next colonoscopy is due in 2032.

## 2023-09-12 NOTE — SERVICENOTES
MD Addendum: The patient was seen along with SAKINA Gomez.  I have evaluated the patient, discussed with her, and agree with the above documented findings, impression, and plan of care.-ANNA

## 2024-02-13 ENCOUNTER — OFFICE (OUTPATIENT)
Dept: URBAN - METROPOLITAN AREA CLINIC 12 | Facility: CLINIC | Age: 74
End: 2024-02-13
Payer: COMMERCIAL

## 2024-02-13 VITALS
SYSTOLIC BLOOD PRESSURE: 137 MMHG | HEART RATE: 78 BPM | OXYGEN SATURATION: 96 % | DIASTOLIC BLOOD PRESSURE: 76 MMHG | HEIGHT: 64 IN | WEIGHT: 233 LBS

## 2024-02-13 DIAGNOSIS — K21.9 GASTRO-ESOPHAGEAL REFLUX DISEASE WITHOUT ESOPHAGITIS: ICD-10-CM

## 2024-02-13 DIAGNOSIS — R14.0 ABDOMINAL DISTENSION (GASEOUS): ICD-10-CM

## 2024-02-13 DIAGNOSIS — K59.00 CONSTIPATION, UNSPECIFIED: ICD-10-CM

## 2024-02-13 PROCEDURE — 99213 OFFICE O/P EST LOW 20 MIN: CPT

## 2024-02-13 NOTE — SERVICEHPINOTES
Dipti Bellaym is a 73 year old female here today for follow up. Overall she has been doing well since last time she was seen. She states that she has been taking MiraLAX daily which helped with her symptoms. She did not need to use Lizness or Trulance since the MiraLAX is helping. She states that she is currenly on Rybelsus for DM. She lost 7lb and HgA1c improved to 5.9. She had labs by her endocrinologist Dr. Phelps. She continues to take Lansoprazole daily which helps with her reflux. She recently received a note from her insurance that it requires prior authorization. She continues to follow up with Dr. Zambrano for her RA. She denies any fever, chills, nausea, vomiting, abdominal pain, weight loss, loss of appetite, melena, or hematochezia. She is due for next colonoscopy 2032.   Previous GI History:Cassy has a history of chronic GERD and constipation who is here for worsening symptoms. She states that she has had issues for several years. When I initially saw her she was have more issues with epigastric pain and indigestion. She states that she had a colonoscopy by Dr. Carson in 2014 that was normal. She also had an EGD by him in June 2016 that was negative with negative stomach, esophagus, and duodenal biopsies. There was mild narrowing of the esophagus and she underwent 50Fr dilation CT scan in June 2016 was normal except for diverticulosis and fatty liver. Abdominal x-ray around the same time was also normal, as was a chest x-ray. She underwent gastric emptying study in August 2016 that was normal. Blood work performed by her PCP in January 2017 revealed a normal CBC and BMP. In the past at [facility] she had an EGD in October 2013 that revealed a ring in the lower esophagus and a 3 cm hiatus hernia. Biopsies of the stomach only revealed reactive gastropathy was negative for H. pylori. She had another EGD in 2012 with negative esophageal and stomach biopsies as well. She has tried Reglan in the past which caused side effects. She has had other imaging in the past as well and has been negative. She states that she has had a positive H. pylori test in the past and was treated with antibiotics, but most recent EGD stomach biopsies were negative. When I initially saw her we checked basic blood work including liver enzymes and celiac labs which were all negative. Abdominal ultrasound only showed some fatty liver and benign kidney cysts. HIDA scan was normal with ejection fraction 59 percent. She did undergo lactulose breath test which did appear consistent with bacterial overgrowth. Unfortunately her insurance company denied use of Xifaxan so she was given a course of metronidazole. At the very end of treatment of this she did have some issues with hematuria and because of this went to [facility] and was treated with different antibiotics for a 10 day course. CT scan during her ED visit was completely normal, as was blood work. While taking her antibiotics for her urinary tract infection she started having worse diarrhea that persisted. Because of this she was seen by her PCP at the beginning of June 2017 where stool study confirmed that she was positive for C. difficile. Because of this she was started on metronidazole, which has since improved her symptoms. Because of some return of symptoms she underwent EGD and colonoscopy in October 2017 with EGD overall normal with negative stomach and duodenal biopsies. There was some mild lymphangiectasia. Esophageal biopsies were also negative. The colon was also normal to the terminal ileum with only a small polyp removed. It was hyperplastic. She states that she was told she had pre-cancerous polyps at some point and so her next colonoscopy is due in 2022. We did try to switch her to an H2 blocker but she states ranitidine caused constipation and tendinitis. Pepcid did not work either. We gave her Trulance in the past which she did not like. She does take occasional NSAIDs for rheumatoid arthritis and has been weaned off of Rituxan. Last year she was having some more issues with dysphagia and so because of this she underwent EGD along with colonoscopy due to rectal bleeding and change in bowel habits in May 2022. 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 Of note, she has had negative esophageal biopsies in the past. There was some mild gastritis with biopsies unremarkable. On colonoscopy there was some diverticulosis but the colonoscopy was otherwise normal to the terminal ileum with removal of a small hyperplastic polyp and so her next colonoscopy is due in 2032.

## 2024-06-18 ENCOUNTER — OFFICE (OUTPATIENT)
Dept: URBAN - METROPOLITAN AREA CLINIC 11 | Facility: CLINIC | Age: 74
End: 2024-06-18
Payer: COMMERCIAL

## 2024-06-18 VITALS
WEIGHT: 229 LBS | OXYGEN SATURATION: 98 % | HEIGHT: 64 IN | SYSTOLIC BLOOD PRESSURE: 134 MMHG | HEART RATE: 78 BPM | DIASTOLIC BLOOD PRESSURE: 81 MMHG

## 2024-06-18 DIAGNOSIS — K59.00 CONSTIPATION, UNSPECIFIED: ICD-10-CM

## 2024-06-18 DIAGNOSIS — R10.84 GENERALIZED ABDOMINAL PAIN: ICD-10-CM

## 2024-06-18 DIAGNOSIS — K21.9 GASTRO-ESOPHAGEAL REFLUX DISEASE WITHOUT ESOPHAGITIS: ICD-10-CM

## 2024-06-18 PROCEDURE — 99214 OFFICE O/P EST MOD 30 MIN: CPT | Performed by: INTERNAL MEDICINE

## 2024-06-18 RX ORDER — DICYCLOMINE HYDROCHLORIDE 10 MG/1
CAPSULE ORAL
Qty: 60 | Refills: 3 | Status: ACTIVE
Start: 2024-06-18

## 2024-06-20 ENCOUNTER — OFFICE (OUTPATIENT)
Dept: URBAN - METROPOLITAN AREA CLINIC 22 | Facility: CLINIC | Age: 74
End: 2024-06-20
Payer: COMMERCIAL

## 2024-06-20 DIAGNOSIS — K59.00 CONSTIPATION, UNSPECIFIED: ICD-10-CM

## 2024-06-20 DIAGNOSIS — R10.84 GENERALIZED ABDOMINAL PAIN: ICD-10-CM

## 2024-06-20 DIAGNOSIS — K21.9 GASTRO-ESOPHAGEAL REFLUX DISEASE WITHOUT ESOPHAGITIS: ICD-10-CM

## 2024-06-20 PROCEDURE — 74177 CT ABD & PELVIS W/CONTRAST: CPT | Mod: TC | Performed by: INTERNAL MEDICINE

## 2025-03-03 ENCOUNTER — OFFICE (OUTPATIENT)
Dept: URBAN - METROPOLITAN AREA CLINIC 11 | Facility: CLINIC | Age: 75
End: 2025-03-03
Payer: MEDICARE

## 2025-03-03 VITALS
DIASTOLIC BLOOD PRESSURE: 89 MMHG | SYSTOLIC BLOOD PRESSURE: 160 MMHG | HEIGHT: 64 IN | DIASTOLIC BLOOD PRESSURE: 78 MMHG | WEIGHT: 217 LBS | OXYGEN SATURATION: 100 % | HEART RATE: 60 BPM | SYSTOLIC BLOOD PRESSURE: 173 MMHG

## 2025-03-03 DIAGNOSIS — R13.10 DYSPHAGIA, UNSPECIFIED: ICD-10-CM

## 2025-03-03 DIAGNOSIS — Z86.19 PERSONAL HISTORY OF OTHER INFECTIOUS AND PARASITIC DISEASES: ICD-10-CM

## 2025-03-03 DIAGNOSIS — K21.9 GASTRO-ESOPHAGEAL REFLUX DISEASE WITHOUT ESOPHAGITIS: ICD-10-CM

## 2025-03-03 PROCEDURE — 99214 OFFICE O/P EST MOD 30 MIN: CPT
